# Patient Record
Sex: MALE | Race: WHITE | NOT HISPANIC OR LATINO | Employment: UNEMPLOYED | URBAN - METROPOLITAN AREA
[De-identification: names, ages, dates, MRNs, and addresses within clinical notes are randomized per-mention and may not be internally consistent; named-entity substitution may affect disease eponyms.]

---

## 2023-09-25 ENCOUNTER — TELEPHONE (OUTPATIENT)
Dept: BEHAVIORAL HEALTH | Facility: CLINIC | Age: 53
End: 2023-09-25

## 2023-09-25 NOTE — TELEPHONE ENCOUNTER
Reason for call:  Other   Patient called regarding (reason for call): pt call regarding suicidal intentions connected   Additional comments: suicidal intent and homicidal towards others.   Pt said they have been drinking heavily and may need detox . Patient refused emergency call and insisted they do not want anyone showing up at their house. Op intake contacted consulted conference inpatient line and patient made a plan to come in on their own in the next day or so for inpatient help.   Phone number to reach patient:  Cell number on file:    Telephone Information:   Mobile [phone]       Best Time:  any     Can we leave a detailed message on this number?  YES    Travel screening: Not Applicable

## 2023-09-27 ENCOUNTER — HOSPITAL ENCOUNTER (EMERGENCY)
Facility: CLINIC | Age: 53
End: 2023-09-27

## 2023-09-27 ENCOUNTER — TELEPHONE (OUTPATIENT)
Dept: BEHAVIORAL HEALTH | Facility: CLINIC | Age: 53
End: 2023-09-27

## 2023-09-27 ENCOUNTER — HOSPITAL ENCOUNTER (EMERGENCY)
Facility: CLINIC | Age: 53
Discharge: ANOTHER HOSPITAL | End: 2023-09-28
Attending: EMERGENCY MEDICINE | Admitting: EMERGENCY MEDICINE
Payer: MEDICAID

## 2023-09-27 DIAGNOSIS — F10.10 ETOH ABUSE: ICD-10-CM

## 2023-09-27 LAB
ALBUMIN SERPL BCG-MCNC: 4.3 G/DL (ref 3.5–5.2)
ALBUMIN UR-MCNC: 50 MG/DL
ALP SERPL-CCNC: 66 U/L (ref 40–129)
ALT SERPL W P-5'-P-CCNC: 46 U/L (ref 0–70)
AMPHETAMINES UR QL SCN: ABNORMAL
ANION GAP SERPL CALCULATED.3IONS-SCNC: 20 MMOL/L (ref 7–15)
APAP SERPL-MCNC: <5 UG/ML (ref 10–30)
APPEARANCE UR: CLEAR
AST SERPL W P-5'-P-CCNC: 70 U/L (ref 0–45)
BARBITURATES UR QL SCN: ABNORMAL
BASOPHILS # BLD AUTO: 0.1 10E3/UL (ref 0–0.2)
BASOPHILS NFR BLD AUTO: 1 %
BENZODIAZ UR QL SCN: ABNORMAL
BILIRUB SERPL-MCNC: 0.8 MG/DL
BILIRUB UR QL STRIP: NEGATIVE
BUN SERPL-MCNC: 9.7 MG/DL (ref 6–20)
BZE UR QL SCN: ABNORMAL
CALCIUM SERPL-MCNC: 8.6 MG/DL (ref 8.6–10)
CANNABINOIDS UR QL SCN: ABNORMAL
CHLORIDE SERPL-SCNC: 92 MMOL/L (ref 98–107)
COLOR UR AUTO: YELLOW
CREAT SERPL-MCNC: 1.07 MG/DL (ref 0.67–1.17)
DEPRECATED HCO3 PLAS-SCNC: 27 MMOL/L (ref 22–29)
EGFRCR SERPLBLD CKD-EPI 2021: 83 ML/MIN/1.73M2
EOSINOPHIL # BLD AUTO: 0 10E3/UL (ref 0–0.7)
EOSINOPHIL NFR BLD AUTO: 0 %
ERYTHROCYTE [DISTWIDTH] IN BLOOD BY AUTOMATED COUNT: 13.9 % (ref 10–15)
ETHANOL SERPL-MCNC: 0.09 G/DL
ETHANOL SERPL-MCNC: 0.19 G/DL
ETHANOL SERPL-MCNC: 0.31 G/DL
FENTANYL UR QL: ABNORMAL
GLUCOSE SERPL-MCNC: 117 MG/DL (ref 70–99)
GLUCOSE UR STRIP-MCNC: NEGATIVE MG/DL
HCT VFR BLD AUTO: 49.2 % (ref 40–53)
HGB BLD-MCNC: 17.3 G/DL (ref 13.3–17.7)
HGB UR QL STRIP: ABNORMAL
HYALINE CASTS: 6 /LPF
IMM GRANULOCYTES # BLD: 0 10E3/UL
IMM GRANULOCYTES NFR BLD: 0 %
KETONES UR STRIP-MCNC: NEGATIVE MG/DL
LEUKOCYTE ESTERASE UR QL STRIP: NEGATIVE
LYMPHOCYTES # BLD AUTO: 2.3 10E3/UL (ref 0.8–5.3)
LYMPHOCYTES NFR BLD AUTO: 27 %
MAGNESIUM SERPL-MCNC: 1.7 MG/DL (ref 1.7–2.3)
MCH RBC QN AUTO: 31.7 PG (ref 26.5–33)
MCHC RBC AUTO-ENTMCNC: 35.2 G/DL (ref 31.5–36.5)
MCV RBC AUTO: 90 FL (ref 78–100)
MONOCYTES # BLD AUTO: 0.8 10E3/UL (ref 0–1.3)
MONOCYTES NFR BLD AUTO: 9 %
NEUTROPHILS # BLD AUTO: 5.5 10E3/UL (ref 1.6–8.3)
NEUTROPHILS NFR BLD AUTO: 63 %
NITRATE UR QL: NEGATIVE
NRBC # BLD AUTO: 0 10E3/UL
NRBC BLD AUTO-RTO: 0 /100
OPIATES UR QL SCN: ABNORMAL
PCP QUAL URINE (ROCHE): ABNORMAL
PH UR STRIP: 5.5 [PH] (ref 5–7)
PLATELET # BLD AUTO: 356 10E3/UL (ref 150–450)
POTASSIUM SERPL-SCNC: 2.7 MMOL/L (ref 3.4–5.3)
POTASSIUM SERPL-SCNC: 2.7 MMOL/L (ref 3.4–5.3)
POTASSIUM SERPL-SCNC: 3.2 MMOL/L (ref 3.4–5.3)
PROT SERPL-MCNC: 6.9 G/DL (ref 6.4–8.3)
RBC # BLD AUTO: 5.46 10E6/UL (ref 4.4–5.9)
RBC URINE: 2 /HPF
SALICYLATES SERPL-MCNC: <0.3 MG/DL
SODIUM SERPL-SCNC: 139 MMOL/L (ref 135–145)
SP GR UR STRIP: 1.01 (ref 1–1.03)
UROBILINOGEN UR STRIP-MCNC: NORMAL MG/DL
WBC # BLD AUTO: 8.7 10E3/UL (ref 4–11)
WBC URINE: 2 /HPF

## 2023-09-27 PROCEDURE — 80143 DRUG ASSAY ACETAMINOPHEN: CPT | Performed by: EMERGENCY MEDICINE

## 2023-09-27 PROCEDURE — 80053 COMPREHEN METABOLIC PANEL: CPT | Performed by: EMERGENCY MEDICINE

## 2023-09-27 PROCEDURE — 80179 DRUG ASSAY SALICYLATE: CPT | Performed by: EMERGENCY MEDICINE

## 2023-09-27 PROCEDURE — 85025 COMPLETE CBC W/AUTO DIFF WBC: CPT | Performed by: EMERGENCY MEDICINE

## 2023-09-27 PROCEDURE — 250N000013 HC RX MED GY IP 250 OP 250 PS 637: Performed by: EMERGENCY MEDICINE

## 2023-09-27 PROCEDURE — 80307 DRUG TEST PRSMV CHEM ANLYZR: CPT | Performed by: EMERGENCY MEDICINE

## 2023-09-27 PROCEDURE — 36415 COLL VENOUS BLD VENIPUNCTURE: CPT | Performed by: EMERGENCY MEDICINE

## 2023-09-27 PROCEDURE — 81001 URINALYSIS AUTO W/SCOPE: CPT | Performed by: EMERGENCY MEDICINE

## 2023-09-27 PROCEDURE — 84132 ASSAY OF SERUM POTASSIUM: CPT | Performed by: EMERGENCY MEDICINE

## 2023-09-27 PROCEDURE — 83735 ASSAY OF MAGNESIUM: CPT | Performed by: EMERGENCY MEDICINE

## 2023-09-27 PROCEDURE — 99285 EMERGENCY DEPT VISIT HI MDM: CPT

## 2023-09-27 PROCEDURE — 82077 ASSAY SPEC XCP UR&BREATH IA: CPT | Performed by: EMERGENCY MEDICINE

## 2023-09-27 RX ORDER — MAGNESIUM HYDROXIDE/ALUMINUM HYDROXICE/SIMETHICONE 120; 1200; 1200 MG/30ML; MG/30ML; MG/30ML
30 SUSPENSION ORAL EVERY 4 HOURS PRN
Status: CANCELLED | OUTPATIENT
Start: 2023-09-27

## 2023-09-27 RX ORDER — ACETAMINOPHEN 325 MG/1
650 TABLET ORAL EVERY 4 HOURS PRN
Status: CANCELLED | OUTPATIENT
Start: 2023-09-27

## 2023-09-27 RX ORDER — POTASSIUM CHLORIDE 1.5 G/1.58G
40 POWDER, FOR SOLUTION ORAL ONCE
Status: DISCONTINUED | OUTPATIENT
Start: 2023-09-27 | End: 2023-09-27

## 2023-09-27 RX ORDER — ONDANSETRON 4 MG/1
4 TABLET, FILM COATED ORAL EVERY 6 HOURS PRN
Status: CANCELLED | OUTPATIENT
Start: 2023-09-27

## 2023-09-27 RX ORDER — POTASSIUM CHLORIDE 1.5 G/1.58G
40 POWDER, FOR SOLUTION ORAL ONCE
Status: COMPLETED | OUTPATIENT
Start: 2023-09-27 | End: 2023-09-27

## 2023-09-27 RX ORDER — AMOXICILLIN 250 MG
1 CAPSULE ORAL 2 TIMES DAILY PRN
Status: CANCELLED | OUTPATIENT
Start: 2023-09-27

## 2023-09-27 RX ORDER — TRAZODONE HYDROCHLORIDE 50 MG/1
50 TABLET, FILM COATED ORAL
Status: CANCELLED | OUTPATIENT
Start: 2023-09-27

## 2023-09-27 RX ORDER — LOPERAMIDE HCL 2 MG
2 CAPSULE ORAL 4 TIMES DAILY PRN
Status: CANCELLED | OUTPATIENT
Start: 2023-09-27

## 2023-09-27 RX ORDER — POTASSIUM CHLORIDE 1500 MG/1
40 TABLET, EXTENDED RELEASE ORAL ONCE
Status: COMPLETED | OUTPATIENT
Start: 2023-09-27 | End: 2023-09-27

## 2023-09-27 RX ORDER — HYDROXYZINE HYDROCHLORIDE 25 MG/1
25 TABLET, FILM COATED ORAL EVERY 4 HOURS PRN
Status: CANCELLED | OUTPATIENT
Start: 2023-09-27

## 2023-09-27 RX ORDER — POTASSIUM CHLORIDE 29.8 MG/ML
20 INJECTION INTRAVENOUS ONCE
Status: DISCONTINUED | OUTPATIENT
Start: 2023-09-27 | End: 2023-09-27

## 2023-09-27 RX ADMIN — POTASSIUM CHLORIDE 40 MEQ: 1500 TABLET, EXTENDED RELEASE ORAL at 17:11

## 2023-09-27 RX ADMIN — POTASSIUM CHLORIDE 40 MEQ: 1.5 POWDER, FOR SOLUTION ORAL at 18:53

## 2023-09-27 ASSESSMENT — ACTIVITIES OF DAILY LIVING (ADL)
ADLS_ACUITY_SCORE: 35

## 2023-09-27 NOTE — ED PROVIDER NOTES
History     Chief Complaint:  Alcohol Problem       HPI   John Paul Velazquez is a 53 year old male alcohol problem.  Patient has history of alcohol abuse.  Reports drinking a pint of liquor a day.  Reports drinking half a pint today and last drink was an hour ago.  States that he is seeking help for alcohol detox.  States he has history of anxiety and depression.  States that he has had intermittent SI however no suicidal ideations at this time.      Independent Historian:   None - Patient Only    Review of External Notes:   No prior medical records.      Medications:    No current outpatient medications on file.      Past Medical History:    No past medical history on file.    Past Surgical History:    No past surgical history on file.     Physical Exam   Patient Vitals for the past 24 hrs:   BP Temp Temp src Pulse Resp SpO2   09/27/23 1442 (!) 133/93 98  F (36.7  C) Temporal 67 18 98 %        Physical Exam  Vitals reviewed.   Constitutional:       General: He is not in acute distress.     Appearance: He is not ill-appearing.   HENT:      Head: Normocephalic and atraumatic.   Eyes:      Extraocular Movements: Extraocular movements intact.   Cardiovascular:      Rate and Rhythm: Normal rate and regular rhythm.   Pulmonary:      Effort: Pulmonary effort is normal. No respiratory distress.      Breath sounds: Normal breath sounds. No wheezing.   Abdominal:      Palpations: Abdomen is soft.      Tenderness: There is no abdominal tenderness. There is no guarding.   Musculoskeletal:      Cervical back: Normal range of motion.   Skin:     General: Skin is warm and dry.   Neurological:      Mental Status: He is alert and oriented to person, place, and time.      GCS: GCS eye subscore is 4. GCS verbal subscore is 5. GCS motor subscore is 6.   Psychiatric:         Behavior: Behavior normal.           Emergency Department Course       Imaging:  No orders to display          Laboratory:  Labs Ordered and Resulted from Time of ED  Arrival to Time of ED Departure   COMPREHENSIVE METABOLIC PANEL - Abnormal       Result Value    Sodium 139      Potassium 2.7 (*)     Carbon Dioxide (CO2) 27      Anion Gap 20 (*)     Urea Nitrogen 9.7      Creatinine 1.07      GFR Estimate 83      Calcium 8.6      Chloride 92 (*)     Glucose 117 (*)     Alkaline Phosphatase 66      AST 70 (*)     ALT 46      Protein Total 6.9      Albumin 4.3      Bilirubin Total 0.8     ROUTINE UA WITH MICROSCOPIC REFLEX TO CULTURE - Abnormal    Color Urine Yellow      Appearance Urine Clear      Glucose Urine Negative      Bilirubin Urine Negative      Ketones Urine Negative      Specific Gravity Urine 1.012      Blood Urine Small (*)     pH Urine 5.5      Protein Albumin Urine 50 (*)     Urobilinogen Urine Normal      Nitrite Urine Negative      Leukocyte Esterase Urine Negative      RBC Urine 2      WBC Urine 2      Hyaline Casts Urine 6 (*)    ETHYL ALCOHOL LEVEL - Abnormal    Alcohol ethyl 0.31 (*)    ACETAMINOPHEN LEVEL - Abnormal    Acetaminophen <5.0 (*)    DRUG ABUSE SCREEN QUAL URINE - Abnormal    Amphetamines Urine Screen Negative      Barbituates Urine Screen Negative      Benzodiazepine Urine Screen Negative      Cannabinoids Urine Screen Positive (*)     Cocaine Urine Screen Positive (*)     Fentanyl Qual Urine Screen Negative      Opiates Urine Screen Negative      PCP Urine Screen Negative     POTASSIUM - Abnormal    Potassium 2.7 (*)    ETHYL ALCOHOL LEVEL - Abnormal    Alcohol ethyl 0.19 (*)    MAGNESIUM - Normal    Magnesium 1.7     SALICYLATE LEVEL - Normal    Salicylate <0.3     CBC WITH PLATELETS AND DIFFERENTIAL    WBC Count 8.7      RBC Count 5.46      Hemoglobin 17.3      Hematocrit 49.2      MCV 90      MCH 31.7      MCHC 35.2      RDW 13.9      Platelet Count 356      % Neutrophils 63      % Lymphocytes 27      % Monocytes 9      % Eosinophils 0      % Basophils 1      % Immature Granulocytes 0      NRBCs per 100 WBC 0      Absolute Neutrophils 5.5       Absolute Lymphocytes 2.3      Absolute Monocytes 0.8      Absolute Eosinophils 0.0      Absolute Basophils 0.1      Absolute Immature Granulocytes 0.0      Absolute NRBCs 0.0          Procedures       Emergency Department Course & Assessments:    PSS-3      Date and Time Over the past 2 weeks have you felt down, depressed, or hopeless? Over the past 2 weeks have you had thoughts of killing yourself? Have you ever attempted to kill yourself? When did this last happen? User   23 1441 yes yes -- -- LND                Item Assessment   Suicidal Ideation None   Plan None   Intent Non   Suicidal or self-harm behaviors Hx ETOH abuse   Risk Factors Substance abuse or dependence   Protective Factors Supportive social network of family and/or friends       Interventions:  Medications   potassium chloride ER (KLOR-CON M) CR tablet 40 mEq (40 mEq Oral $Given 23 1711)   potassium chloride (KLOR-CON) Packet 40 mEq (40 mEq Oral $Given 23 1853)        Assessments:  Initially seen and evaluated in triage.    Independent Interpretation (X-rays, CTs, rhythm strip):  None    Consultations/Discussion of Management or Tests:  None   ED Course as of 09/27/23 2052   Wed Sep 27, 2023   1643 Reassessed patient he is resting comfortably.  Potassium replaced.   1646 Patient has a place at [facility] for detox    Pt accepted to [facility] for detox by Dr. Doss       Social Determinants of Health affecting care:   None    Disposition:  The patient was transferred to [facility] via EMS. Dr. Doss accepted the patient for transfer.     Impression & Plan        Medical Decision Makin-year-old male presenting today with alcohol abuse.  Patient states he is seeking detox.  States he drinks a pint today, last drink was an hour prior to arrival.  Currently no signs of alcohol withdrawal.  Denies any headache, nausea, vomiting, hallucinations.  No prior alcohol withdrawal seizures in the past.  On arrival labs were obtained  while in triage.  EtOH of 0.3.  He was brought back to behavioral.  A spot was secured at [facility] for the patient.  Patient required to be transported by EMS.  Patient noted to be hypokalemic, p.o. replacement given twice.  Patient was accepted for transfer.      Diagnosis:    ICD-10-CM    1. ETOH abuse  F10.10                Kristel Rodriguez DO  9/27/2023   Kristel Rodriguez DO Doan, Tiffani, DO  09/27/23 2056

## 2023-09-27 NOTE — ED NOTES
PIT/Triage Evaluation    Patient presented with alcohol problem.  Patient states that he has been drinking alcohol daily for the last 5 years.  States he drinks a pint a day.  Last drink was 1 hour ago.  States he has had half a pint today.  He states that he is seeking treatment for alcohol detox.  States his cousin brought him to the ER today.  He also states that he has history of anxiety and depression.  States that he has had SI on and off.  Currently denies any suicidal ideations.  Denies any current headaches, lightheadedness, dizziness.    Exam is notable for:  General:  Alert, interactive  Cardiovascular:  Well perfused  Lungs:  No respiratory distress, no accessory muscle use  Neuro:  Moving all 4 extremities  Skin:  Warm, dry  Psych:  Normal affect      Appropriate interventions for symptom management were initiated if applicable.  Appropriate diagnostic tests were initiated if indicated.    Important information for subsequent clinician:  Plan to patient that there is not a detox facility here.  Patient states that he was told to come here for detox.  Discussed plan for blood work and then possible with transfer to detox center.    I briefly evaluated the patient and developed an initial plan of care. I discussed this plan and explained that this brief interaction does not constitute a full evaluation. Patient/family understands that they should wait to be fully evaluated and discuss any test results with another clinician prior to leaving the hospital.       Kristel Rodriguez DO  09/27/23 0301       Kristel Rodriguez DO  09/27/23 [id number]

## 2023-09-27 NOTE — ED TRIAGE NOTES
Presents to ED for alcohol detoxication; pt appears drunk. Says he had 1/2 pint about an hour ago. Daily drinker, hx withdrawals. Seeking detox. Pt says he has thoughts of SI/HI, but at this moment he does not.

## 2023-09-27 NOTE — ED NOTES
Patient requested DETOX, writer called [facility] and place a bed hold.  K= replacement and redraw with ETOH recheck and then he will be discharge and his cousin will drive him to [place].

## 2023-09-27 NOTE — ED NOTES
Patrick HOFF and ETOH for [facility].  IF they are acceptable he will get a ride to [place] [facility] detox with his cousin.

## 2023-09-27 NOTE — TELEPHONE ENCOUNTER
S: [facility] ED , Provider LUIS Buenrostro RN calling at 4:25 PM with clinical on a 53 year old/Male presenting for alcohol detox.     B: Pt presents for ETOH detox.   Currently reports drinking 1 pint daily for last 5 days but drinking for years.    Patient reports last use was just prior to ED.  Pt BAC: .31  Pt  denies hx of DT  Pt  denies hx of seizures. Last seizure: N/A  Pt endorsing the following symptoms of withdrawal: Hypertensive and Still too intoxicated  MSSA Score: [facility] does CIWA = 7 but still really intoxicated    Pt got a banana bag and potassium.    Pt denies acute mental health or medical concerns.   Pt endorses other drug use: Cocaine Amount/frequency: N/A    Does Pt have a detox care plan in [facility]? No  Does pt present with specific needs, assistive devices, or exclusionary criteria? None  Is the patient ambulating, eating and drinking in the ED? Yes    A: Pt meets criteria to be presented for IP detox admission. Patient is voluntary    COVID Symptoms: No  If yes, COVID test required   Utox: Positive for THC and Cocaine  CMP: Abnormalities: Potassium is 2.7; Anion Gap is 2.0; and AST is 70  CBC: WNL  HCG: N/A     R: Patient cleared and ready for behavioral bed placement: Yes    Pt is meeting criteria for presentation to 3A/CD    Does Patient need a Transfer Center request created? Yes, writer completed Transfer Center request at:  4:42 PM    4:37 PM Paged 3A Provider    4:39 PM Anna wants Potassium levels and ETOH rechecked before formally accepting.    4:43 PM Called [facility] ED and talked to JOSSY Buenrostro who was about to give Pt more Potassium.  Updated that afterwards to have Potassium levels and ETOH rechecked and Intake will re-present with updated numbers.    6:48 PM Chitra [facility] ED RN updated on Pt.    6:52 PM Paged 3A On Call Provider    7:12 PM Diana accepted for 3A/CD/Rom[facility]    Updated worklist and added to Admit Board; did transfer center and bed planning and did Indicia.    7:23 PM Updated  3A and left a message for Charge RN    7:24 PM Updated [facility] ED

## 2023-09-28 ENCOUNTER — HOSPITAL ENCOUNTER (INPATIENT)
Facility: CLINIC | Age: 53
LOS: 3 days | Discharge: HOME | End: 2023-10-01
Attending: PSYCHIATRY & NEUROLOGY | Admitting: PSYCHIATRY & NEUROLOGY
Payer: MEDICAID

## 2023-09-28 VITALS
SYSTOLIC BLOOD PRESSURE: 131 MMHG | TEMPERATURE: 98 F | DIASTOLIC BLOOD PRESSURE: 98 MMHG | HEART RATE: 92 BPM | WEIGHT: 140 LBS | RESPIRATION RATE: 18 BRPM | OXYGEN SATURATION: 97 %

## 2023-09-28 DIAGNOSIS — Z92.89 S/P ALCOHOL DETOXIFICATION: Primary | ICD-10-CM

## 2023-09-28 LAB
ANION GAP SERPL CALCULATED.3IONS-SCNC: 10 MMOL/L (ref 7–15)
BUN SERPL-MCNC: 10.8 MG/DL (ref 6–20)
CALCIUM SERPL-MCNC: 9 MG/DL (ref 8.6–10)
CHLORIDE SERPL-SCNC: 98 MMOL/L (ref 98–107)
CREAT SERPL-MCNC: 1.04 MG/DL (ref 0.67–1.17)
EGFRCR SERPLBLD CKD-EPI 2021: 86 ML/MIN/1.73M2
GLUCOSE SERPL-MCNC: 167 MG/DL (ref 70–99)
HCO3 SERPL-SCNC: 31 MMOL/L (ref 22–29)
MAGNESIUM SERPL-MCNC: 1.5 MG/DL (ref 1.7–2.3)
PHOSPHATE SERPL-MCNC: 2.5 MG/DL (ref 2.5–4.5)
POTASSIUM SERPL-SCNC: 2.9 MMOL/L (ref 3.4–5.3)
POTASSIUM SERPL-SCNC: 3.8 MMOL/L (ref 3.4–5.3)
SODIUM SERPL-SCNC: 139 MMOL/L (ref 135–145)

## 2023-09-28 PROCEDURE — 250N000013 HC RX MED GY IP 250 OP 250 PS 637: Performed by: REGISTERED NURSE

## 2023-09-28 PROCEDURE — 250N000013 HC RX MED GY IP 250 OP 250 PS 637: Performed by: EMERGENCY MEDICINE

## 2023-09-28 PROCEDURE — 84100 ASSAY OF PHOSPHORUS: CPT | Performed by: PHYSICIAN ASSISTANT

## 2023-09-28 PROCEDURE — 82374 ASSAY BLOOD CARBON DIOXIDE: CPT | Performed by: PHYSICIAN ASSISTANT

## 2023-09-28 PROCEDURE — 99222 1ST HOSP IP/OBS MODERATE 55: CPT | Performed by: PHYSICIAN ASSISTANT

## 2023-09-28 PROCEDURE — 250N000013 HC RX MED GY IP 250 OP 250 PS 637: Performed by: PHYSICIAN ASSISTANT

## 2023-09-28 PROCEDURE — 36415 COLL VENOUS BLD VENIPUNCTURE: CPT | Performed by: PHYSICIAN ASSISTANT

## 2023-09-28 PROCEDURE — 250N000013 HC RX MED GY IP 250 OP 250 PS 637: Performed by: PSYCHIATRY & NEUROLOGY

## 2023-09-28 PROCEDURE — 93010 ELECTROCARDIOGRAM REPORT: CPT | Performed by: INTERNAL MEDICINE

## 2023-09-28 PROCEDURE — 83735 ASSAY OF MAGNESIUM: CPT | Performed by: PHYSICIAN ASSISTANT

## 2023-09-28 PROCEDURE — 84132 ASSAY OF SERUM POTASSIUM: CPT | Performed by: PHYSICIAN ASSISTANT

## 2023-09-28 PROCEDURE — HZ2ZZZZ DETOXIFICATION SERVICES FOR SUBSTANCE ABUSE TREATMENT: ICD-10-PCS | Performed by: PSYCHIATRY & NEUROLOGY

## 2023-09-28 PROCEDURE — 99223 1ST HOSP IP/OBS HIGH 75: CPT | Mod: AI | Performed by: PSYCHIATRY & NEUROLOGY

## 2023-09-28 PROCEDURE — 999N000054 HC STATISTIC EKG NON-CHARGEABLE

## 2023-09-28 PROCEDURE — 128N000004 HC R&B CD ADULT

## 2023-09-28 RX ORDER — METOPROLOL SUCCINATE 25 MG/1
25 TABLET, EXTENDED RELEASE ORAL DAILY
Status: ON HOLD | COMMUNITY
End: 2023-10-01

## 2023-09-28 RX ORDER — TRAZODONE HYDROCHLORIDE 50 MG/1
50 TABLET, FILM COATED ORAL
Status: DISCONTINUED | OUTPATIENT
Start: 2023-09-28 | End: 2023-10-01 | Stop reason: HOSPADM

## 2023-09-28 RX ORDER — NICOTINE 21 MG/24HR
1 PATCH, TRANSDERMAL 24 HOURS TRANSDERMAL DAILY
Status: DISCONTINUED | OUTPATIENT
Start: 2023-09-28 | End: 2023-10-01 | Stop reason: HOSPADM

## 2023-09-28 RX ORDER — LOPERAMIDE HCL 2 MG
2 CAPSULE ORAL 4 TIMES DAILY PRN
Status: DISCONTINUED | OUTPATIENT
Start: 2023-09-28 | End: 2023-10-01 | Stop reason: HOSPADM

## 2023-09-28 RX ORDER — FUROSEMIDE 40 MG
40 TABLET ORAL 2 TIMES DAILY
Status: DISCONTINUED | OUTPATIENT
Start: 2023-09-28 | End: 2023-10-01 | Stop reason: HOSPADM

## 2023-09-28 RX ORDER — POTASSIUM CHLORIDE 750 MG/1
10 TABLET, EXTENDED RELEASE ORAL DAILY
COMMUNITY

## 2023-09-28 RX ORDER — LISINOPRIL 10 MG/1
10 TABLET ORAL DAILY
Status: DISCONTINUED | OUTPATIENT
Start: 2023-09-28 | End: 2023-10-01 | Stop reason: HOSPADM

## 2023-09-28 RX ORDER — DIAZEPAM 5 MG
5-20 TABLET ORAL EVERY 30 MIN PRN
Status: DISCONTINUED | OUTPATIENT
Start: 2023-09-28 | End: 2023-10-01 | Stop reason: HOSPADM

## 2023-09-28 RX ORDER — ALBUTEROL SULFATE 90 UG/1
2 AEROSOL, METERED RESPIRATORY (INHALATION) EVERY 6 HOURS PRN
COMMUNITY

## 2023-09-28 RX ORDER — METOPROLOL SUCCINATE 25 MG/1
25 TABLET, EXTENDED RELEASE ORAL DAILY
Status: DISCONTINUED | OUTPATIENT
Start: 2023-09-28 | End: 2023-10-01 | Stop reason: HOSPADM

## 2023-09-28 RX ORDER — FOLIC ACID 1 MG/1
1 TABLET ORAL DAILY
Status: DISCONTINUED | OUTPATIENT
Start: 2023-09-28 | End: 2023-10-01 | Stop reason: HOSPADM

## 2023-09-28 RX ORDER — ONDANSETRON 4 MG/1
4 TABLET, FILM COATED ORAL EVERY 6 HOURS PRN
Status: DISCONTINUED | OUTPATIENT
Start: 2023-09-28 | End: 2023-10-01 | Stop reason: HOSPADM

## 2023-09-28 RX ORDER — LISINOPRIL 10 MG/1
10 TABLET ORAL DAILY
COMMUNITY

## 2023-09-28 RX ORDER — MAGNESIUM OXIDE 400 MG/1
400 TABLET ORAL 2 TIMES DAILY
Status: DISCONTINUED | OUTPATIENT
Start: 2023-09-28 | End: 2023-09-29

## 2023-09-28 RX ORDER — MAGNESIUM HYDROXIDE/ALUMINUM HYDROXICE/SIMETHICONE 120; 1200; 1200 MG/30ML; MG/30ML; MG/30ML
30 SUSPENSION ORAL EVERY 4 HOURS PRN
Status: DISCONTINUED | OUTPATIENT
Start: 2023-09-28 | End: 2023-10-01 | Stop reason: HOSPADM

## 2023-09-28 RX ORDER — AMOXICILLIN 250 MG
1 CAPSULE ORAL 2 TIMES DAILY PRN
Status: DISCONTINUED | OUTPATIENT
Start: 2023-09-28 | End: 2023-10-01 | Stop reason: HOSPADM

## 2023-09-28 RX ORDER — POTASSIUM CHLORIDE 750 MG/1
10 CAPSULE, EXTENDED RELEASE ORAL DAILY
Status: DISCONTINUED | OUTPATIENT
Start: 2023-09-28 | End: 2023-10-01 | Stop reason: HOSPADM

## 2023-09-28 RX ORDER — POTASSIUM CHLORIDE 20MEQ/15ML
40 LIQUID (ML) ORAL ONCE
Status: COMPLETED | OUTPATIENT
Start: 2023-09-28 | End: 2023-09-28

## 2023-09-28 RX ORDER — ACETAMINOPHEN 325 MG/1
650 TABLET ORAL EVERY 4 HOURS PRN
Status: DISCONTINUED | OUTPATIENT
Start: 2023-09-28 | End: 2023-10-01 | Stop reason: HOSPADM

## 2023-09-28 RX ORDER — ALBUTEROL SULFATE 90 UG/1
2 AEROSOL, METERED RESPIRATORY (INHALATION) EVERY 6 HOURS PRN
Status: DISCONTINUED | OUTPATIENT
Start: 2023-09-28 | End: 2023-10-01 | Stop reason: HOSPADM

## 2023-09-28 RX ORDER — POTASSIUM CHLORIDE 1.5 G/1.58G
40 POWDER, FOR SOLUTION ORAL ONCE
Status: COMPLETED | OUTPATIENT
Start: 2023-09-28 | End: 2023-09-28

## 2023-09-28 RX ORDER — BUDESONIDE AND FORMOTEROL FUMARATE DIHYDRATE 160; 4.5 UG/1; UG/1
2 AEROSOL RESPIRATORY (INHALATION)
COMMUNITY

## 2023-09-28 RX ORDER — FLUTICASONE FUROATE AND VILANTEROL 200; 25 UG/1; UG/1
1 POWDER RESPIRATORY (INHALATION) DAILY
Status: DISCONTINUED | OUTPATIENT
Start: 2023-09-28 | End: 2023-10-01 | Stop reason: HOSPADM

## 2023-09-28 RX ORDER — FUROSEMIDE 40 MG
40 TABLET ORAL 2 TIMES DAILY
COMMUNITY

## 2023-09-28 RX ORDER — CLONIDINE HYDROCHLORIDE 0.1 MG/1
0.1 TABLET ORAL EVERY 6 HOURS PRN
Status: DISCONTINUED | OUTPATIENT
Start: 2023-09-28 | End: 2023-10-01 | Stop reason: HOSPADM

## 2023-09-28 RX ORDER — MULTIPLE VITAMINS W/ MINERALS TAB 9MG-400MCG
1 TAB ORAL DAILY
Status: DISCONTINUED | OUTPATIENT
Start: 2023-09-28 | End: 2023-10-01 | Stop reason: HOSPADM

## 2023-09-28 RX ORDER — LISINOPRIL 5 MG/1
5 TABLET ORAL DAILY
Status: DISCONTINUED | OUTPATIENT
Start: 2023-09-28 | End: 2023-09-28

## 2023-09-28 RX ORDER — FLUTICASONE FUROATE AND VILANTEROL 200; 25 UG/1; UG/1
2 POWDER RESPIRATORY (INHALATION) 2 TIMES DAILY
Status: DISCONTINUED | OUTPATIENT
Start: 2023-09-28 | End: 2023-09-28

## 2023-09-28 RX ORDER — HYDROXYZINE HYDROCHLORIDE 25 MG/1
25 TABLET, FILM COATED ORAL EVERY 4 HOURS PRN
Status: DISCONTINUED | OUTPATIENT
Start: 2023-09-28 | End: 2023-10-01 | Stop reason: HOSPADM

## 2023-09-28 RX ADMIN — ALBUTEROL SULFATE 2 PUFF: 90 AEROSOL, METERED RESPIRATORY (INHALATION) at 21:00

## 2023-09-28 RX ADMIN — DIAZEPAM 10 MG: 5 TABLET ORAL at 07:10

## 2023-09-28 RX ADMIN — FLUTICASONE FUROATE AND VILANTEROL TRIFENATATE 1 PUFF: 200; 25 POWDER RESPIRATORY (INHALATION) at 13:15

## 2023-09-28 RX ADMIN — POTASSIUM CHLORIDE 40 MEQ: 1.5 POWDER, FOR SOLUTION ORAL at 00:33

## 2023-09-28 RX ADMIN — POTASSIUM CHLORIDE 40 MEQ: 40 SOLUTION ORAL at 12:46

## 2023-09-28 RX ADMIN — MAGNESIUM OXIDE TAB 400 MG (241.3 MG ELEMENTAL MG) 400 MG: 400 (241.3 MG) TAB at 20:50

## 2023-09-28 RX ADMIN — ALBUTEROL SULFATE 2 PUFF: 90 AEROSOL, METERED RESPIRATORY (INHALATION) at 11:17

## 2023-09-28 RX ADMIN — ACETAMINOPHEN 650 MG: 325 TABLET, FILM COATED ORAL at 16:50

## 2023-09-28 RX ADMIN — DIAZEPAM 10 MG: 5 TABLET ORAL at 22:21

## 2023-09-28 RX ADMIN — HYDROXYZINE HYDROCHLORIDE 25 MG: 25 TABLET, FILM COATED ORAL at 20:50

## 2023-09-28 RX ADMIN — DIAZEPAM 10 MG: 5 TABLET ORAL at 02:51

## 2023-09-28 RX ADMIN — LISINOPRIL 10 MG: 10 TABLET ORAL at 12:47

## 2023-09-28 RX ADMIN — NICOTINE POLACRILEX 2 MG: 2 GUM, CHEWING BUCCAL at 16:53

## 2023-09-28 RX ADMIN — MULTIPLE VITAMINS W/ MINERALS TAB 1 TABLET: TAB at 08:28

## 2023-09-28 RX ADMIN — THIAMINE HCL TAB 100 MG 100 MG: 100 TAB at 08:28

## 2023-09-28 RX ADMIN — MAGNESIUM OXIDE TAB 400 MG (241.3 MG ELEMENTAL MG) 400 MG: 400 (241.3 MG) TAB at 12:47

## 2023-09-28 RX ADMIN — TRAZODONE HYDROCHLORIDE 50 MG: 50 TABLET ORAL at 20:49

## 2023-09-28 RX ADMIN — POTASSIUM CHLORIDE 10 MEQ: 750 CAPSULE, EXTENDED RELEASE ORAL at 12:47

## 2023-09-28 RX ADMIN — FOLIC ACID 1 MG: 1 TABLET ORAL at 08:28

## 2023-09-28 RX ADMIN — NICOTINE 1 PATCH: 14 PATCH, EXTENDED RELEASE TRANSDERMAL at 09:15

## 2023-09-28 RX ADMIN — DIAZEPAM 10 MG: 5 TABLET ORAL at 16:50

## 2023-09-28 ASSESSMENT — ACTIVITIES OF DAILY LIVING (ADL)
ADLS_ACUITY_SCORE: 28
ADLS_ACUITY_SCORE: 28
DRESS: INDEPENDENT
ADLS_ACUITY_SCORE: 28
HYGIENE/GROOMING: INDEPENDENT
ADLS_ACUITY_SCORE: 28
ADLS_ACUITY_SCORE: 28
ORAL_HYGIENE: INDEPENDENT
ADLS_ACUITY_SCORE: 28
DRESS: INDEPENDENT
HYGIENE/GROOMING: INDEPENDENT
ADLS_ACUITY_SCORE: 35
ADLS_ACUITY_SCORE: 28
ORAL_HYGIENE: INDEPENDENT
ADLS_ACUITY_SCORE: 28

## 2023-09-28 ASSESSMENT — LIFESTYLE VARIABLES
AUDIT-C TOTAL SCORE: 12
SKIP TO QUESTIONS 9-10: 0

## 2023-09-28 NOTE — PLAN OF CARE
Goal Outcome Evaluation:    Plan of Care Reviewed With: patient      Problem: Substance Withdrawal  Goal: Substance Withdrawal  Description: Signs and symptoms of listed problems will be absent or manageable.  Outcome: Progressing     Pt presented with a flat affect, anxious mood. Endorsed anxiety 8/10, PRN Valium given with MSSA assessment, depression 8/10, generalized body pain 5/10, PRN Tylenol effective. Denied SI, HI, hallucinations, and contracted for safety. MSSA was 9 & 4, received PRN Valium x 1. PRN hydroxyzine & trazodone given at HS. Pt retired early to bed. A round 2215, PA reported to writer that this pt was very agitated and had slammed the door. Writer assessed pt who stated that he was experiencing withdrawal, MSSA was done, scored 9, PRN Valium given at 2221.

## 2023-09-28 NOTE — PLAN OF CARE
Problem: Adult Behavioral Health Plan of Care  Goal: Plan of Care Review  Outcome: Progressing  Flowsheets (Taken 9/28/2023 1013)  Patient Agreement with Plan of Care: agrees   Goal Outcome Evaluation:    Plan of Care Reviewed With: patient          Patient alert and oriented on approach. He had breakfast and is drinking adequately. Denied pain, SI, HI, depression and anxiety, hallucinations and contracted for safety. Patient is engaging on approach.   This shift, MSSA done once since last MSSA was right before 0800. MSSA at 1200 was 3.  Patient BP noted as increased by PA. Writer rechecked patient's BP at 0813 and it was 147/114. Internal medicine provider Kelsey paged about BP at 0823. Clonidine ordered by her. Writer rechecked BP to administer clonidine and BP decreased to 138/99-no prn / clonidine noted. Kelsey informed.  Patient completed his menus and spoke with provider on unit.     Kelsey placed new orders and pharmacist spoke with patient and his outside pharmacy and new medications verified. Writer rechecked patient BP and HR in order to administer scheduled Lisinopril and Metoprolol-no parameters noted. Writer spoke with provider Kelsey who was on unit and she worked with patient and writer and agreed for writer to administer the lisinopril only due to HR at 50 and the fluctuations noted in patient's HR-metoprolol not administered. Patient remains fine on unit and has been calm, pleasant, cooperative and compliant with medications. See Kelsey's note for details and she agreed to update BP medications by adding parameters.

## 2023-09-28 NOTE — PLAN OF CARE
Behavioral Team Discussion: (9/28/2023)    Continued Stay Criteria/Rationale: Patient admitted for  Alcohol Use Disorder.  Plan: The following services will be provided to the patient; psychiatric assessment, medication management, therapeutic milieu, individual and group support, and skills groups.   Participants: 3A Provider: Dr. Basil Doss MD; 3A RN: Aline Nicohle, RN; 3A CM's: Bruce Campbell.  Summary/Recommendation: Providers will assess today for treatment recommendations, discharge planning, and aftercare plans. CM will meet with pt for discharge planning.   Medical/Physical: Patient denies any medical or physical concerns at this time.  Precautions:   Behavioral Orders   Procedures    Code 1 - Restrict to Unit    Routine Programming     As clinically indicated    Status 15     Every 15 minutes.    Withdrawal precautions     Rationale for change in precautions or plan: N/A  Progress: Initial.    ASAM Dimension Scale Ratings:  Dimension 1: 3 Client tolerates and deya with withdrawal discomfort poorly. Client has severe intoxication, such that the client endangers self or others, or intoxication has not abated with less intensive levels of services. Client displays severe signs and symptoms; or risk of severe, but manageable withdrawal; or withdrawal worsening despite detox at less intensive level.  Dimension 2: 1 Client tolerates and deya with physical discomfort and is able to get the services that the client needs.  Dimension 3: 2 Client has difficulty with impulse control and lacks coping skills. Client has thoughts of suicide or harm to others without means; however, the thoughts may interfere with participation in some treatment activities. Client has difficulty functioning in significant life areas. Client has moderate symptoms of emotional, behavioral, or cognitive problems. Client is able to participate in most treatment activities.  Dimension 4: 3 Client displays inconsistent compliance, minimal  awareness of either the client's addiction or mental disorder, and is minimally cooperative.  Dimension 5: 3 Client has poor recognition and understanding of relapse and recidivism issues and displays moderately high vulnerability for further substance use or mental health problems. Client has few coping skills and rarely applies coping skills.  Dimension 6: 3 Client is not engaged in structured, meaningful activity and the client's peers, family, significant other, and living environment are unsupportive, or there is significant criminal justice system involvement.

## 2023-09-28 NOTE — ED NOTES
K redraw was the same additional K given, called [facility] unit intake, they will call with acceptance by MD and get report from us.  Cousin is here to transport him to [place].

## 2023-09-28 NOTE — PHARMACY-ADMISSION MEDICATION HISTORY
Pharmacist Admission Medication History    Admission medication history is complete. The information provided in this note is only as accurate as the sources available at the time of the update.    Medication reconciliation/reorder completed by provider prior to medication history? No    Information Source(s): Patient and Patient's pharmacy via phone    Pertinent Information: Patient does not remember his medications but reports that he takes 3. He knew he is taking lisinopril, potassium and lasix. I spoke to the Pharmacist at his pharmacy where he reports filling all of his medications. He has consistently filled the lasix, however has not filled any other prescriptions since April 2023. Medication list was updated with the information from the pharmacy.     Changes made to PTA medication list:  Added: Symbicort & metoprolol XL  Deleted: None  Changed: lisinopril dose increased from 5 mg -> 10mg    Medication Affordability:       Allergies reviewed with patient and updates made in EHR: unable to assess    Medication History Completed By: ROB DUBOIS [facility] 9/28/2023 11:16 AM    Prior to Admission medications    Medication Sig Last Dose Taking? Auth Provider Long Term End Date   budesonide-formoterol (SYMBICORT) 160-4.5 MCG/ACT Inhaler Inhale 2 puffs into the lungs two times daily Last filled April 2023 for 30 days supply   Unknown, Entered By History Yes    furosemide (LASIX) 40 MG tablet Take 40 mg by mouth 2 times daily Last filled August 2023 for 30 days supply Past Week Yes Reported, Patient Yes    lisinopril (ZESTRIL) 10 MG tablet Take 10 mg by mouth daily Last filled April 2023 9/27/2023 Yes Reported, Patient Yes    albuterol (PROAIR HFA/PROVENTIL HFA/VENTOLIN HFA) 108 (90 Base) MCG/ACT inhaler Inhale 2 puffs into the lungs every 6 hours as needed for shortness of breath, wheezing or cough   Reported, Patient Yes    metoprolol succinate ER (TOPROL XL) 25 MG 24 hr tablet Take 25 mg by mouth daily Last filled  April 2023 for 30 days supply   Unknown, Entered By History Yes    potassium chloride ER (K-TAB/KLOR-CON) 10 MEQ CR tablet Take 10 mEq by mouth daily Past Week Yes Reported, Patient       Desiree Brand, PharmD   Clinical Pharmacist

## 2023-09-28 NOTE — H&P
"[facility], [place]   Psychiatric History and Physical  Admission date: 9/28/2023        Chief Complaint:   \"I feel like crap\"        HPI:     The patient is a 54yo male with a history of alcohol use disorder and depression who was admitted to detoxify. Has also been abusing cocaine. Reports that he feels \"like crap.\" Has had some depression and passive SI but no urges or plans. Says \"I was trying to drink myself to death.\" No history of suicide attempts. Says that he \"barely\" slept. Denies any nausea but wasn't able to eat much. Open to MAT for AUD. Considering CD treatment.      Per ER:  John Paul Velazquez is a 53 year old male alcohol problem.  Patient has history of alcohol abuse.  Reports drinking a pint of liquor a day.  Reports drinking half a pint today and last drink was an hour ago.  States that he is seeking help for alcohol detox.  States he has history of anxiety and depression.  States that he has had intermittent SI however no suicidal ideations at this time.         Past Psychiatric History:     Denies any history of suicide attempts.         Substance Use and History:     Alcohol use disorder. Denies a history of withdrawal seizures or DTs.   Stimulant abuse.   Smokes 1/2 PPD.          Past Medical History:   PAST MEDICAL HISTORY: No past medical history on file.    PAST SURGICAL HISTORY: No past surgical history on file.          Family History:   FAMILY HISTORY: Father and mother with alcohol and cocaine abuse.         Social History:   Please see the full psychosocial profile from the clinical treatment coordinator.   SOCIAL HISTORY:   Social History     Tobacco Use    Smoking status: Not on file    Smokeless tobacco: Not on file   Substance Use Topics    Alcohol use: Not on file            Physical ROS:   The patient endorsed fatigue. The remainder of 10-point review of systems was negative except as noted in HPI.         PTA Medications:     No medications prior to " admission.          Allergies:   No Known Allergies       Labs:     Recent Results (from the past 48 hour(s))   Comprehensive metabolic panel    Collection Time: 09/27/23  2:49 PM   Result Value Ref Range    Sodium 139 135 - 145 mmol/L    Potassium 2.7 (L) 3.4 - 5.3 mmol/L    Carbon Dioxide (CO2) 27 22 - 29 mmol/L    Anion Gap 20 (H) 7 - 15 mmol/L    Urea Nitrogen 9.7 6.0 - 20.0 mg/dL    Creatinine 1.07 0.67 - 1.17 mg/dL    GFR Estimate 83 >60 mL/min/1.73m2    Calcium 8.6 8.6 - 10.0 mg/dL    Chloride 92 (L) 98 - 107 mmol/L    Glucose 117 (H) 70 - 99 mg/dL    Alkaline Phosphatase 66 40 - 129 U/L    AST 70 (H) 0 - 45 U/L    ALT 46 0 - 70 U/L    Protein Total 6.9 6.4 - 8.3 g/dL    Albumin 4.3 3.5 - 5.2 g/dL    Bilirubin Total 0.8 <=1.2 mg/dL   Magnesium    Collection Time: 09/27/23  2:49 PM   Result Value Ref Range    Magnesium 1.7 1.7 - 2.3 mg/dL   Alcohol level blood    Collection Time: 09/27/23  2:49 PM   Result Value Ref Range    Alcohol ethyl 0.31 (HH) <=0.01 g/dL   Salicylate level    Collection Time: 09/27/23  2:49 PM   Result Value Ref Range    Salicylate <0.3   mg/dL   Acetaminophen level    Collection Time: 09/27/23  2:49 PM   Result Value Ref Range    Acetaminophen <5.0 (L) 10.0 - 30.0 ug/mL   CBC with platelets and differential    Collection Time: 09/27/23  2:49 PM   Result Value Ref Range    WBC Count 8.7 4.0 - 11.0 10e3/uL    RBC Count 5.46 4.40 - 5.90 10e6/uL    Hemoglobin 17.3 13.3 - 17.7 g/dL    Hematocrit 49.2 40.0 - 53.0 %    MCV 90 78 - 100 fL    MCH 31.7 26.5 - 33.0 pg    MCHC 35.2 31.5 - 36.5 g/dL    RDW 13.9 10.0 - 15.0 %    Platelet Count 356 150 - 450 10e3/uL    % Neutrophils 63 %    % Lymphocytes 27 %    % Monocytes 9 %    % Eosinophils 0 %    % Basophils 1 %    % Immature Granulocytes 0 %    NRBCs per 100 WBC 0 <1 /100    Absolute Neutrophils 5.5 1.6 - 8.3 10e3/uL    Absolute Lymphocytes 2.3 0.8 - 5.3 10e3/uL    Absolute Monocytes 0.8 0.0 - 1.3 10e3/uL    Absolute Eosinophils 0.0 0.0 - 0.7  10e3/uL    Absolute Basophils 0.1 0.0 - 0.2 10e3/uL    Absolute Immature Granulocytes 0.0 <=0.4 10e3/uL    Absolute NRBCs 0.0 10e3/uL   UA with Microscopic reflex to Culture    Collection Time: 09/27/23  2:51 PM    Specimen: Urine, Midstream   Result Value Ref Range    Color Urine Yellow Colorless, Straw, Light Yellow, Yellow    Appearance Urine Clear Clear    Glucose Urine Negative Negative mg/dL    Bilirubin Urine Negative Negative    Ketones Urine Negative Negative mg/dL    Specific Gravity Urine 1.012 1.003 - 1.035    Blood Urine Small (A) Negative    pH Urine 5.5 5.0 - 7.0    Protein Albumin Urine 50 (A) Negative mg/dL    Urobilinogen Urine Normal Normal, 2.0 mg/dL    Nitrite Urine Negative Negative    Leukocyte Esterase Urine Negative Negative    RBC Urine 2 <=2 /HPF    WBC Urine 2 <=5 /HPF    Hyaline Casts Urine 6 (H) <=2 /LPF   Drug Abuse Screen Qual Urine    Collection Time: 09/27/23  2:51 PM   Result Value Ref Range    Amphetamines Urine Screen Negative Screen Negative    Barbituates Urine Screen Negative Screen Negative    Benzodiazepine Urine Screen Negative Screen Negative    Cannabinoids Urine Screen Positive (A) Screen Negative    Cocaine Urine Screen Positive (A) Screen Negative    Fentanyl Qual Urine Screen Negative Screen Negative    Opiates Urine Screen Negative Screen Negative    PCP Urine Screen Negative Screen Negative   Potassium    Collection Time: 09/27/23  6:11 PM   Result Value Ref Range    Potassium 2.7 (L) 3.4 - 5.3 mmol/L   Alcohol level blood    Collection Time: 09/27/23  6:11 PM   Result Value Ref Range    Alcohol ethyl 0.19 (H) <=0.01 g/dL   Potassium    Collection Time: 09/27/23 11:17 PM   Result Value Ref Range    Potassium 3.2 (L) 3.4 - 5.3 mmol/L   Alcohol level blood    Collection Time: 09/27/23 11:17 PM   Result Value Ref Range    Alcohol ethyl 0.09 (H) <=0.01 g/dL          Physical and Psychiatric Examination:     BP (!) 153/113   Pulse 97   Temp 98.8  F (37.1  C) (Oral)    "Resp 16   Ht 1.651 m (5' 5\")   Wt 54.4 kg (120 lb)   SpO2 96%   BMI 19.97 kg/m    Weight is 120 lbs 0 oz  Body mass index is 19.97 kg/m .    Physical Exam:  I have reviewed the physical exam as documented by the medical team and agree with findings and assessment and have no additional findings to add at this time.    Mental Status Exam:  Appearance: awake, alert and adequately groomed  Attitude:  cooperative  Eye Contact:  fair  Mood:  depressed  Affect:  mood congruent  Speech:  clear, coherent  Language: fluent and intact in English  Psychomotor, Gait, Musculoskeletal:  no evidence of tardive dyskinesia, dystonia, or tics  Thought Process:  goal oriented  Associations:  no loose associations  Thought Content:  no evidence of suicidal ideation or homicidal ideation and no evidence of psychotic thought  Insight:  fair  Judgement:  fair  Oriented to:  time, person, and place  Attention Span and Concentration:  intact  Recent and Remote Memory:  fair  Fund of Knowledge:  appropriate         Admission Diagnoses:     Alcohol use disorder, severe  Alcohol withdrawal with unspecified complication   MDD versus alcohol-induced depression  Nicotine dependence with current use     Clinically Significant Risk Factors Present on Admission     # Hypokalemia: Lowest K = 2.7 mmol/L in last 2 days, will replace as needed        # Anion Gap Metabolic Acidosis: Highest Anion Gap = 20 mmol/L in last 2 days, will monitor and treat as appropriate                                        Assessment & Plan:     1) MSSA with Valium.   2) Patient to be provided information on MAT for AUD.   3) Nicotine replacement available.   4) Patient considering CD treatment.     Disposition Plan   Reason for ongoing admission: requires detoxification from substance that poses a risk of bodily harm during withdrawal period  Discharge location: Chemical dependency treatment facility  Discharge Medications: not ordered  Follow-up Appointments: not " scheduled  Legal Status: voluntary    Entered by: Basil Doss MD on 9/28/2023 at 5:05 AM

## 2023-09-28 NOTE — PLAN OF CARE
Goal Outcome Evaluation:    Problem: Substance Withdrawal  Goal: Substance Withdrawal  Description: Signs and symptoms of listed problems will be absent or manageable.  Outcome: Progressing     S: pt is a 53 year old male who was admitted from [facility] ED for alcohol detox.    B:Pt reports drinking 1 pint of liquor daily for the last 5 days but has been drinking for years. Last use was just before going to ED. SABRINA was 0.31 at 14:49 and 0.09 at 23:17. Denies hx of seizures & DT. Pt is voluntary. Positive UTOX : positive for THC and cocaine    A: Pt presented with a flat affect, anxious mood. Endorsed anxiety 7/10, PRN Valium given with MSSA assessment helpful, depression 9/10. Denied pain, SI, HI, hallucinations, and contracted for safety. Pt smokes 1/2 pack of cigarettes, will like a patch and gum. MSSA was 8 & 8, received PRN Valium x 2.    R: pt on alcohol monitoring using MSSA with Valium

## 2023-09-28 NOTE — PROGRESS NOTES
09/28/23 0235   Patient Belongings   Did you bring any home meds/supplements to the hospital?  Yes   Disposition of meds  Other (see comment)   Patient Belongings other (see comments)   Belongings Search Yes   Clothing Search Yes   Second Staff Sabrina     Storage Bin: shirt, shoes, pants with belt.   Med-Room Bin: Phone, keys, lip balm, black wireless headphone case with headphones, lighter .  Security Envelope (#[id number]): 5 dollars in cash, 4.03 dollars in coins,  license, Visa card, lottery ticket.   [facility]   ADMISSION:    I am responsible for any personal items that are not sent to the safe or pharmacy. [facility] is not responsible for loss, theft or damage of any property in my possession.    Patient Signature _________________________________________ Date/Time _____________________    Staff Signature ___________________________________________ Date/Time _____________________    2nd Staff person, if patient is unable/unwilling to sign    ________________________________________________________ Date/Time _____________________    DISCHARGE:    All personal items have been returned to me.    Patient Signature _________________________________________ Date/Time _____________________    Staff Signature ___________________________________________ Date/Time _____________________

## 2023-09-28 NOTE — ED NOTES
Called [place] about nurse to nurse report. Was informed that the nurse is dealing with a pt at this time and will call when available.

## 2023-09-28 NOTE — CONSULTS
[facility]  Internal Medicine Consult     John Paul Velazquez MRN# [id number]   Age: 53 year old YOB: 1970     Date of Admission: 9/28/2023  Date of Consult: 9/28/2023    Primary Care Provider: No primary care provider on file.    Requesting Service: Behavioral Health - Basil Doss MD  Reason for Consult: General Medical Evaluation    FYI Patient has another chart listed under MRN: [id number]       SUBJECTIVE   CC:   Alcohol Withdrawal    Assessment and Plan/Recommendations:     John Paul Velazquez is a 53 year old male with PMHx significant alcohol use disorder, HFrEF (EF25%), asthma, HTN who was admitted 09/24/2023 for acute alcohol withdrawal.     Medicine was consulted for medical co-management     Acute alcohol withdrawal   Alcohol withdrawal, hx of alcohol use disorder   MSSA 8 this shift. No hx of withdrawal seizures or Dts. Pt reports drinking 1-2 pints of hard liquor daily. Breath EtOH on arrival was 0.31.   - Continue MSSA   - Folvite, multi-vites, thiamine supplementation   - Further management per Psychiatry   - PRN clonidine ordered for SBP>180 or DBP >110    Transaminitis 2/2 EtOH use disorder   AST 70, ALT 46, Alk Phos 66. Bilirubin 0.8. No significant abdominal pain.  - No need to trend unless pt becomes symptomatic with fever, jaundice, severe abdominal pain, nausea and vomiting.   - Ok to give Tylenol up to 2-3 grams daily for supportive cares   - Repeat CMP in the next few weeks at PCP follow up once pt is outside of acute EtOH withdrawal.     HFrEF, EF 25% 02/2023 per Cardiology note from other chart   HTN  Without acute exacerbation. Per other chart, patient has followed with cardiologist Magdaleno victor last saw of March 2023.  At this appointment he was recommended to start metoprolol 25 mg daily and increase his lisinopril to 10 mg daily.  Per my discussion with patient unclear if he has actually been taking these recommended adjustment to medication and  new medication.  He reports only taking 3 medications for his heart failure prior to admission.  Notes the use of lisinopril as well as his potassium supplement have been very sporadic, and Lasix he was taking every other day. Patient unfortunately has not had close follow-up with his cardiologist due to lack of insurance coverage. Appears euvolemic on exam, maintaining saturations on RA, denies dyspnea.   -Discussed with pharmacy to clarify his medication fills   -Holding PTA lasix 40mg BID for now in the setting of hypokalemia (held for you)  -Resume PTA lisinopril 10mg daily (ordered for you)  -Resume PTA metoprolol XL 25mg daily (ordered for you)  -Recommend having financial counseling meet with patient for discussion of coverage     Hypokalemia  Hypomagnesemia   PTA potassium supplement, but had not been taking consistently prior to admission, however was consistently taking lasix. Suspect this as well as poor intake has been contributing to low potassium. Mg on admission was borderline low, will recheck  -Replaced 40mEq one time now  -Resume PTA potassium 10mEq (ordered for you)  -Recheck Potassium 1400h  -Add on Phos ordered   -Referral for PCP on discharge, patient would like to stay within [facility] who he follows with for heart failure.     Addendum: Follow up K wnl. Will continue with recheck in AM    Tachycardia  Suspect in the setting of acute withdrawal. Asymptomatic   -ECG ordered     Addendum: ECG with sinus tachycardia and PVCs, which patient has a history of, asymptomatic     Asthma  Without acute exacerbation. Patient denies taking daily inhaler at home, but reports using PRN albuterol with exacerbations. Denies wheezing or chest tightness, per second chart, appears patient was prescribed Flovent inhaler following discharge from [facility] hospital in 04/2023. No acute exacerbation   -Start fluticasone-vilanterol in place of PTA Symbicort 1 puff daily   -PRN albuterol     Medicine will continue to follow for  "electrolyte derangements     Kelsey Gallego PA-C  Internal Medicine [facility] Hospitalist  Page job code [phone] (3B), [phone] (3A), or [phone] ([facility] and 4A)  Text paging via [employer] is appreciated  September 28, 2023     HPI:   John Paul Velazquez is a 53 year old male with PMHx significant alcohol use disorder, HFrEF (EF25%), asthma, HTN who was admitted 09/24/2023 for acute alcohol withdrawal.     John Paul states \"I feel like shit\".  Has withdrawal symptoms including tremor, sweats, headache, nausea, diarrhea.  Denies any hallucinations no history of seizures or DTs in the past.  Notes he had not been eating or drinking well prior to admission.  He reports poor intake this morning due to nausea.  Denies vomiting.  Discussed medications with cereal in detail.  Reports that he is only taking 3 medications prior to admission.  Reports his water pill he was taking very regularly, every other day per his report.  He also reports taking a medication for his blood pressure as well as a potassium supplement which he is took very sporadically.  Denies taking any other medications for his heart.  Denies taking any other medications daily for his albuterol.  Tells me he has a history of heart failure and he follows with Magdaleno for his cardiology provider.  He denies any chest pain, palpitations, dyspnea, edema in his bilateral lower extremities.  Denies any asthma-like symptoms including wheezing, chest tightness, cough.  Would like to take a puff of his albuterol though right now.  Mentions that he has not been able to doctor much due to having poor insurance.  Would like to speak with someone with financial to help him get resources for this aspect.  He does not follow with a primary care doctor, he only follows with his cardiologist.  Discussed the benefit of having a primary care doctor which he is open to.  Also discussed electrolyte abnormalities.  Patient is open to having these rechecked.       Past Medical History:   No " "past medical history on file.     Reviewed and updated in [email or web address].     Past Surgical History:    No past surgical history on file.      Social History:         Family History:   No family history on file.      Allergies:   No Known Allergies      Medications:   Reviewed. Please see MAR     Review of Systems:   10 point ROS of systems including Constitutional, Eyes, Respiratory, Cardiovascular, Gastroenterology, Genitourinary, Integumentary, Muscularskeletal, Psychiatric were all negative except for pertinent positives noted in my HPI.    OBJECTIVE   Physical Exam:   Vitals were reviewed  Blood pressure (!) 147/114, pulse 98, temperature 98.4  F (36.9  C), temperature source Oral, resp. rate 16, height 1.651 m (5' 5\"), weight 54.4 kg (120 lb), SpO2 96 %.  General: Alert and oriented x3.  Awake, cooperative, in no acute distress.  EYES: PERRLA, EOM. Anicteric   HENT: Atraumatic.  Normocephalic  Lungs: No increased work of breathing, breathing comfortably on room air, bilateral expiratory wheezing appreciated in bilateral lower lobes, no crackles or rhonchi appreciated  Cardiac: Tachycardic, regular rhythm, normal S1/S2, no murmurs, clicks, rubs, or gallops.  No signs of peripheral edema bilaterally on lower extremities  GI: Abdomen soft, non-tender without rebound or guarding. + Bowel sounds.  Neurologic: No focal deficits, CN II-XII grossly intact  Skin: No jaundice, rashes, or lesions        Data:        Lab Results   Component Value Date     09/27/2023    Lab Results   Component Value Date    CHLORIDE 92 09/27/2023    Lab Results   Component Value Date    BUN 9.7 09/27/2023      Lab Results   Component Value Date    POTASSIUM 3.2 09/27/2023    Lab Results   Component Value Date    CO2 27 09/27/2023    Lab Results   Component Value Date    CR 1.07 09/27/2023        Lab Results   Component Value Date    WBC 8.7 09/27/2023    HGB 17.3 09/27/2023    HCT 49.2 09/27/2023    MCV 90 09/27/2023     09/27/2023 "     Lab Results   Component Value Date    WBC 8.7 09/27/2023

## 2023-09-28 NOTE — DISCHARGE INSTRUCTIONS
Behavioral Discharge Planning and Instructions  THANK YOU FOR CHOOSING [facility]  3A  [phone]    Summary: You were admitted to [facility] on 9/27/23 for detoxification from alcohol.  A medical exam was performed that included lab work. You have met with a  and opted to outpatient treatment.  Please take care and make your recovery a daily priority, John Paul!  It was a pleasure working with you and the entire treatment team here wishes you the very best in your recovery!     Recommendation:    [facility] Outpatient Treatment  [facility] will contact you to schedule intake.  [address] [place] [address]  Phone: [phone]      Main Diagnoses:  Per Dr. Basil Doss MD;  303.90 (F10.20) Alcohol Use Disorder Severe    Major Treatments, Procedures and Findings:  You were treated for alcohol detoxification using Valium. You completed a chemical dependency assessment. You had labs drawn and those results were reviewed with you. Please take a copy of your lab work with you to your next primary care provider appointment.    Symptoms to Report:  If you experience more anxiety, confusion, sleeplessness, deep sadness or thoughts of suicide, notify your treatment team or notify your primary care provider. IF ANY OF THE SYMPTOMS YOU ARE EXPERIENCING ARE A MEDICAL EMERGENCY CALL 911 IMMEDIATELY.     Lifestyle Adjustment: Adjust your lifestyle to get enough sleep, relaxation, exercise and good nutrition. Continue to develop healthy coping skills to decrease stress and promote a sober living environment. Do not use mood altering substances including alcohol, illegal drugs or addictive medications other than what is currently prescribed.     Disposition: Patient is going home first. He will go to Outpatient treatment at [facility] on his own. He verbalizes that he will keep and follow through with this plan.     Facts about COVID19 at www.cdc.gov/COVID19 and www.[place].gov/covid19    Keeping hands  "clean is one of the most important steps we can take to avoid getting sick and spreading germs to others.  Please wash your hands frequently and lather with soap for at least 20 seconds!    Follow-up Appointment:   Patient called his clinic today, at about 1622 and it was closed, he could not schedule a pcp appointment. He verbalized understanding to follow up with a pcp by scheduling an appointment. He said \"I will schedule an appointment tomorrow\". He kept the sheet of paper with the number of [facility].     Recovery apps for your phone to locate current in person and zoom recovery meetings  [place] - meeting james  AA  - meeting james  Meeting guide - meeting james  Quick NA meeting - meeting james  Edna- has various apps    Resources:  Some AA/NA meetings are being held online however most have returned to in-person or a hybrid combination please check online to verify*  Need Support Now? If you or someone you know is struggling or in crisis, help is available. Call or text FluGen or chat Beryllium.org  AA meetings search for them at: https://aa-intergroup.org (worldwide meeting listings)  AA meetings for [place] area can be found online at: https://a[place].org (click local online meetings listings)  NA meetings for [place] area can be found online at: https://www.nam[place].org  (click find a meeting)  AA and NA Sponsors are excellent resources for support and you can find one at any support group meeting.   Alcoholics Anonymous (https://aa.org/): for information 24 hours/day  AA Intergroup service office in [place] (http://www.aastpaul.org/) 249.916.9504  AA Intergroup service office in [facility]: [phone]. (http://www.aamin[place].org/)  Narcotics Anonymous (www.nam[place].org) (935) 685-2040  https://aafairviewriverside.org/meetings  SMART Recovery - self management for addiction recovery:  www.smartrecovery.org  Pathways ~ A Health Crisis Resource & Support Center:  " 367.653.1819.  https://prescribetoprevent.org/patient-education/videos/  http://www.harmreduction.org  [facility] 265-258-3481  Support Group:  AA/NA and Sponsor/support.  National [place] on Mental Illness (www.[place].karime.org): 873.561.5671 or 346-199-4218.  Alcoholics Anonymous (www.alcoholics-anonymous.org): Check your phone book for your local chapter.  Suicide Awareness Voices of Education (SAVE) (www.save.org): 294-452-NFLL (8671)  National Suicide Prevention Line (www.mentalhealthmn.org): 522-752-HPLP (6346)  Mental Health Consumer/Survivor Network of [place] (www.mhcsn.net): 419.425.1394 or 314-448-8210  Mental Health Association of [place] (www.mentalhealth.org): 571.166.5095 or 844-305-1220   Substance Abuse and Mental Health Services (www.samhsa.gov)  [place] Opioid Prevention Coalition: www.opioidcoalition.org    [place] Recovery Connection ([facility])  [facility] connects people seeking recovery to resources that help foster and sustain long-term recovery.  Whether you are seeking resources for treatment, transportation, housing, job training, education, health care or other pathways to recovery, [facility] is a great place to start.  159.281.7839.  www.[place]TigerTrade.eFuelDepot    Great Pod casts for nutrition and wellness  Listen on Apple Podcasts  Dishing Up Nutrition   [employer]   Nutrition       Understand the connection between what you eat and how you feel. Hosted by licensed nutritionists and dietitians from [employer] we share practical, real-life solutions for healthier living through nutrition.     General Medication Instructions:   See your medication sheet(s) for instructions.   Take all medications as prescribed.  Make no changes unless your primary care provider suggests them.   Go to all your primary care provider visits.  Be sure to have all your required lab tests. This way, your medicines can be refilled on time.  Do not use any forms of alcohol.    Please  Note:  If you have any questions at anytime after you are discharged please call [facility] detox unit 3AW at [phone].  [facility], Behavioral Intake [phone]  Medical Records call [phone]  Outpatient Behavioral Intake call [phone]  LP+ Wait List/Bed Availability call [phone]    Please remember to take all of your behavioral discharge planning and lab paperwork to any follow up appointments, it contains your lab results, diagnosis, medication list and discharge recommendations.      THANK YOU FOR CHOOSING [facility]

## 2023-09-28 NOTE — COMMUNITY RESOURCES LIST (ENGLISH)
09/28/2023   [facility]  N/A  For questions about this resource list or additional care needs, please contact your primary care clinic or care manager.  Phone: [phone]   Email: N/A   Address: [address] [address]   Hours: N/A        Substance Use       Outpatient substance use treatment  1  [facility] Distance: 1.2 miles      In-Person, Phone/Virtual   [address]  [place] [address]  Language: English, Russian  Hours: Mon 9:00 AM - 9:00 PM , Tue 9:00 AM - 8:00 PM , Wed - Thu 9:00 AM - 9:00 PM , Fri 9:00 AM - 3:00 PM  Fees: Insurance, Self Pay, Sliding Fee   Phone: (283) 217-2977 Email: intake@Quora Website: https://www.Quora/location/[place]/     2  [facility] Distance: 1.33 miles      In-Person   [address] [address]  Language: English  Hours: Mon - Thu 12:00 PM - 8:30 PM , Fri 10:00 AM - 4:00 PM  Fees: Insurance, Self Pay, State or County Child Welfare Agency   Phone: (914) 331-5116 Email: IOPreferrals@[employer] Website: https://[employer]/project/[place]-[facility]/     Substance use support group  3  Alcoholics Anonymous (AA) - [place] Distance: 6.3 miles      In-Person, Phone/Virtual   [address] [facility] [place] [address]  Language: English  Hours: Mon - Sun Open 24 Hours  Fees: Free   Phone: (284) 429-2963 Email: 14dcm@[facility].org Website: http://www.[facility] Hospitalaa.org/     4  [facility] of Narcotics Anonymous - [facility] - Narcotics Anonymous Distance: 6.93 miles      In-Person, Phone/Virtual   [address] [place] [address]  Language: English  Hours: Mon - Fri 9:00 AM - 5:00 PM  Fees: Free   Email: wrsc@wis[place]na.org Website: https://wisconsinna.org/          Important Numbers & Websites       Emergency Services   911  City Services   311  Poison Control   (228) 788-7922  Suicide Prevention Lifeline   (387)  743-1035 (TALK)  Child Abuse Hotline   (719) 111-2745 (4-A-Child)  Sexual Assault Hotline   (313) 799-1491 (HOPE)  National Runaway Safeline   (756) 762-8940 (RUNAWAY)  All-Options Talkline   (912) 495-2358  Substance Abuse Referral   (703) 540-7969 (HELP)

## 2023-09-28 NOTE — PROGRESS NOTES
"Triage & Transition Services, [address]     John Paul Velazquez  September 28, 2023    Insurance: Patient currently contacting Billing Office to obtain insurance.      Legal Status: Vol     SUDs Assessment Status: Currently working on assessment paperwork.      ROIs on file: None at this time.     Living Situation: Patient lives alone in apartment. Currently unemployed. Patient states he has 6 children in which he has a good relationship with.      Encounter: Patient appears sad and hopeless during session. Patient states he would like more help with mental health issues than substance abuse. Patient states he is too scared to kill himself but would be fine if he drank himself to death stating \"It would make me get into heaven if it was accidental\". Patient contacts for safety to writer on unit. RN notified. Patient interested in inpatient MICD. Patient also willing to accept Psychiatry and Therapy from writer.     Consulted with PIERRE Perkins on Patient s plan of care.     Current Plan: MICD Inpatient. Psychiatry and Individual Therapy.      RN updated.    Amaris Watts  Triage & Transition Services - Mental Health and Addiction Service Line  [address] - Adult Inpatient Addiction Psychiatry Unit          "

## 2023-09-29 LAB
ANION GAP SERPL CALCULATED.3IONS-SCNC: 10 MMOL/L (ref 7–15)
ATRIAL RATE - MUSE: 92 BPM
BUN SERPL-MCNC: 14.3 MG/DL (ref 6–20)
CALCIUM SERPL-MCNC: 8.6 MG/DL (ref 8.6–10)
CHLORIDE SERPL-SCNC: 102 MMOL/L (ref 98–107)
CREAT SERPL-MCNC: 1 MG/DL (ref 0.67–1.17)
DEPRECATED HCO3 PLAS-SCNC: 27 MMOL/L (ref 22–29)
DIASTOLIC BLOOD PRESSURE - MUSE: NORMAL MMHG
EGFRCR SERPLBLD CKD-EPI 2021: >90 ML/MIN/1.73M2
GLUCOSE SERPL-MCNC: 89 MG/DL (ref 70–99)
INTERPRETATION ECG - MUSE: NORMAL
MAGNESIUM SERPL-MCNC: 2.5 MG/DL (ref 1.7–2.3)
P AXIS - MUSE: 67 DEGREES
POTASSIUM SERPL-SCNC: 3.4 MMOL/L (ref 3.4–5.3)
PR INTERVAL - MUSE: 146 MS
QRS DURATION - MUSE: 96 MS
QT - MUSE: 392 MS
QTC - MUSE: 484 MS
R AXIS - MUSE: -61 DEGREES
SODIUM SERPL-SCNC: 139 MMOL/L (ref 135–145)
SYSTOLIC BLOOD PRESSURE - MUSE: NORMAL MMHG
T AXIS - MUSE: 82 DEGREES
VENTRICULAR RATE- MUSE: 92 BPM

## 2023-09-29 PROCEDURE — 80048 BASIC METABOLIC PNL TOTAL CA: CPT | Performed by: PHYSICIAN ASSISTANT

## 2023-09-29 PROCEDURE — 99232 SBSQ HOSP IP/OBS MODERATE 35: CPT | Performed by: PSYCHIATRY & NEUROLOGY

## 2023-09-29 PROCEDURE — 250N000013 HC RX MED GY IP 250 OP 250 PS 637: Performed by: PSYCHIATRY & NEUROLOGY

## 2023-09-29 PROCEDURE — 128N000004 HC R&B CD ADULT

## 2023-09-29 PROCEDURE — 83735 ASSAY OF MAGNESIUM: CPT | Performed by: PHYSICIAN ASSISTANT

## 2023-09-29 PROCEDURE — 250N000013 HC RX MED GY IP 250 OP 250 PS 637: Performed by: PHYSICIAN ASSISTANT

## 2023-09-29 PROCEDURE — H2035 A/D TX PROGRAM, PER HOUR: HCPCS | Mod: HQ

## 2023-09-29 PROCEDURE — 36415 COLL VENOUS BLD VENIPUNCTURE: CPT | Performed by: PHYSICIAN ASSISTANT

## 2023-09-29 PROCEDURE — 250N000013 HC RX MED GY IP 250 OP 250 PS 637: Performed by: REGISTERED NURSE

## 2023-09-29 PROCEDURE — 99207 PR NO BILLABLE SERVICE THIS VISIT: CPT | Performed by: PHYSICIAN ASSISTANT

## 2023-09-29 RX ORDER — MIRTAZAPINE 15 MG/1
15 TABLET, FILM COATED ORAL AT BEDTIME
Status: DISCONTINUED | OUTPATIENT
Start: 2023-09-29 | End: 2023-10-01 | Stop reason: HOSPADM

## 2023-09-29 RX ADMIN — METOPROLOL SUCCINATE 25 MG: 25 TABLET, EXTENDED RELEASE ORAL at 09:03

## 2023-09-29 RX ADMIN — HYDROXYZINE HYDROCHLORIDE 25 MG: 25 TABLET, FILM COATED ORAL at 16:24

## 2023-09-29 RX ADMIN — LISINOPRIL 10 MG: 10 TABLET ORAL at 09:03

## 2023-09-29 RX ADMIN — FUROSEMIDE 40 MG: 40 TABLET ORAL at 20:20

## 2023-09-29 RX ADMIN — MAGNESIUM OXIDE TAB 400 MG (241.3 MG ELEMENTAL MG) 400 MG: 400 (241.3 MG) TAB at 09:03

## 2023-09-29 RX ADMIN — FOLIC ACID 1 MG: 1 TABLET ORAL at 09:04

## 2023-09-29 RX ADMIN — MIRTAZAPINE 15 MG: 15 TABLET, FILM COATED ORAL at 22:39

## 2023-09-29 RX ADMIN — NICOTINE POLACRILEX 2 MG: 2 GUM, CHEWING BUCCAL at 21:41

## 2023-09-29 RX ADMIN — NICOTINE 1 PATCH: 14 PATCH, EXTENDED RELEASE TRANSDERMAL at 09:04

## 2023-09-29 RX ADMIN — DIAZEPAM 10 MG: 5 TABLET ORAL at 20:20

## 2023-09-29 RX ADMIN — MULTIPLE VITAMINS W/ MINERALS TAB 1 TABLET: TAB at 09:03

## 2023-09-29 RX ADMIN — THIAMINE HCL TAB 100 MG 100 MG: 100 TAB at 09:03

## 2023-09-29 RX ADMIN — FLUTICASONE FUROATE AND VILANTEROL TRIFENATATE 1 PUFF: 200; 25 POWDER RESPIRATORY (INHALATION) at 09:07

## 2023-09-29 RX ADMIN — NICOTINE POLACRILEX 2 MG: 2 GUM, CHEWING BUCCAL at 19:59

## 2023-09-29 RX ADMIN — POTASSIUM CHLORIDE 10 MEQ: 750 CAPSULE, EXTENDED RELEASE ORAL at 09:03

## 2023-09-29 RX ADMIN — ALUMINUM HYDROXIDE, MAGNESIUM HYDROXIDE, AND SIMETHICONE 30 ML: 200; 200; 20 SUSPENSION ORAL at 14:28

## 2023-09-29 RX ADMIN — DIAZEPAM 10 MG: 5 TABLET ORAL at 16:24

## 2023-09-29 RX ADMIN — ALBUTEROL SULFATE 2 PUFF: 90 AEROSOL, METERED RESPIRATORY (INHALATION) at 14:29

## 2023-09-29 RX ADMIN — ALBUTEROL SULFATE 2 PUFF: 90 AEROSOL, METERED RESPIRATORY (INHALATION) at 09:08

## 2023-09-29 ASSESSMENT — ACTIVITIES OF DAILY LIVING (ADL)
ADLS_ACUITY_SCORE: 28
ORAL_HYGIENE: INDEPENDENT
ADLS_ACUITY_SCORE: 28
HYGIENE/GROOMING: INDEPENDENT
ADLS_ACUITY_SCORE: 28
DRESS: INDEPENDENT
ADLS_ACUITY_SCORE: 28

## 2023-09-29 NOTE — PLAN OF CARE
Problem: Substance Withdrawal  Goal: Substance Withdrawal  Description: Signs and symptoms of listed problems will be absent or manageable.  Outcome: Progressing   Goal Outcome Evaluation:     Patient slept 7 During the night. MSSA score were 7 and 5 no PRN Was given. Safety checks done every 15 minutes. No concern noted.

## 2023-09-29 NOTE — PROGRESS NOTES
Triage & Transition Services, [address]     Writer met with patient multiple times today and encouraged him to complete assessment paperwork. Writer and patient talked about different options and have decided he is going to Inpatient at [facility] in [facility]. He will need to complete his assessment and have it faxed to [facility]. Writer called and they have a bed available for Monday as of today. Patient informed.    Patient signed ROIs for [facility], [facility], [facility] and [facility]. Assessment will need to be faxed and patient will decide what I best for him.     Plan: [facility] Inpatient    Amaris Watts  Triage & Transition Services - Mental Health and Addiction Service Line  [address] - Adult Inpatient Addiction Psychiatry Unit

## 2023-09-29 NOTE — PLAN OF CARE
Problem: Suicide Risk  Goal: Absence of Self-Harm  Outcome: Progressing   Goal Outcome Evaluation:    Plan of Care Reviewed With: patient          Patient alert and oriented x 4. He appeared tensed and upset. He endorsed anxiety 8/10, depression 8/10, passive SI thoughts without intent and plan, SOB - (SpO2 97% RA, PRN albuterol inhaler given twice this shift- effective per patient). Patient reported that he's annoyed and upset about his current situation. Added that he wanted to go home but doctor recommended for him to stay for a couple more days. Patient appeared agitated while he was talking. He was, however, medication and care compliant. He ate 100% of his breakfast and is taking fluids adequately. He was intermittently present in the milieu.     Patient was in a better mood at lunch time, he was smiling during the conversation. Reported that his anxiety and depression are low. Encouraged patient to increase fluid intake per IM's advise. Patient verbalized understanding.     Patient has new order for blood draw tomorrow morning. Order for lasix resumed.     MSSA 5, 6. Last valium given on 09/28 at 2221 hrs.      PRN maalox given at 1430 hrs for indigestion.

## 2023-09-29 NOTE — PROGRESS NOTES
"[facility], [place]   Psychiatric Progress Note        Interim History:   The patient's care was discussed with the treatment team during the daily team meeting and/or staff's chart notes were reviewed.  Staff report patient is still in withdrawal. Has been depressed and endorsing passive SI. Is open to MICD treatment.     The patient reports that he is feeling better in terms of withdrawal but is still depressed. Is feeling passive SI but this is better. Discussed past antidepressant use and patient has been on Zoloft, Celexa, Wellbutrin, Effexor as well as Seroquel and Risperdal. Was tried on Remeron but admits that he didn't take it very long. Wants to try something that would help with sleep and is willing to try Remeron again. Isn't sure about medications for cravings or Antabuse. Isn't sure about staying through the weekend to get into treatment.          Medications:      fluticasone-vilanterol  1 puff Inhalation Daily    folic acid  1 mg Oral Daily    [Held by provider] furosemide  40 mg Oral BID    lisinopril  10 mg Oral Daily    metoprolol succinate ER  25 mg Oral Daily    multivitamin w/minerals  1 tablet Oral Daily    nicotine  1 patch Transdermal Daily    nicotine   Transdermal Q8H    potassium chloride ER  10 mEq Oral Daily    thiamine  100 mg Oral Daily          Allergies:   No Known Allergies       Labs:     See Epic.          Psychiatric Examination:     BP (!) 128/95 (BP Location: Left arm)   Pulse 97   Temp 97.3  F (36.3  C) (Temporal)   Resp 16   Ht 1.651 m (5' 5\")   Wt 54.4 kg (120 lb)   SpO2 98%   BMI 19.97 kg/m    Weight is 120 lbs 0 oz  Body mass index is 19.97 kg/m .  Orthostatic Vitals       None              Appearance: awake, alert and adequately groomed  Attitude:  somewhat cooperative  Eye Contact:  fair  Mood:  depressed  Affect:  mood congruent  Speech:  clear, coherent  Psychomotor Behavior:  no evidence of tardive dyskinesia, dystonia, or " tics  Thought Process:  goal oriented  Associations:  no loose associations  Thought Content:  passive suicidal ideation present  Insight:  fair  Judgement:  fair  Oriented to:  time, person, and place  Attention Span and Concentration:  intact  Recent and Remote Memory:  fair         Precautions:     Behavioral Orders   Procedures    Code 1 - Restrict to Unit    Routine Programming     As clinically indicated    Status 15     Every 15 minutes.    Withdrawal precautions          Diagnoses:     Alcohol use disorder, severe  Alcohol withdrawal with unspecified complication   MDD, recurrent, moderate to severe without psychosis  Nicotine dependence with current use     Clinically Significant Risk Factors        # Hypokalemia: Lowest K = 2.7 mmol/L in last 2 days, will replace as needed     # Hypomagnesemia: Lowest Mg = 1.5 mg/dL in last 2 days, will replace as needed  # Anion Gap Metabolic Acidosis: Highest Anion Gap = 20 mmol/L in last 2 days, will monitor and treat as appropriate                                            Plan:     1) Continue MSSA protocol.   2) Patient provided information on MAT for AUD.   3) Start Remeron at 15mg at bedtime.   4) Patient considering residential MICD treatment.       Disposition Plan   Reason for ongoing admission: poses an imminent risk to self and requires detoxification from substance that poses a risk of bodily harm during withdrawal period  Discharge location: Chemical dependency treatment facility  Discharge Medications: not ordered  Follow-up Appointments: not scheduled  Legal Status: voluntary    Entered by: Basil Doss MD on September 29, 2023 at 11:35 AM

## 2023-09-29 NOTE — PROGRESS NOTES
Brief Hospital Medicine Note:     John Paul Velazquez is a 53 year old male with PMHx significant alcohol use disorder, HFrEF (EF25%), asthma, HTN who was admitted 09/24/2023 for acute alcohol withdrawal.     Medicine team following up on BMP. Potassium improved. Have resumed PTA lasix. Will recheck K in AM to ensure it remains stable.     Nursing notes, vitals, and labs reviewed.    Medicine will continue to follow up on BMP in AM    Kelsey Loja PA-C  Hospitalist Service  Contact information available via [facility] Paging/Directory

## 2023-09-29 NOTE — CARE PLAN
09/29/23 1841   Group Therapy Session   Group Attendance attended group session   Time Session Began 1645   Time Session Ended 1745   Total Time (minutes) 60   Total # Attendees 6   Group Type psychotherapeutic;addiction   Group Topic Covered coping skills/lifestyle management;relapse prevention;disease of addiction/choices in recovery;emotions/expression;co-occurring illness   Group Session Detail CTC-Group members completed/discussed a worksheet packet on change, reasons of your addiction, triggers and reasons to quit.   Patient Response/Contribution expressed understanding of topic;listened actively;discussed personal experience with topic;cooperative with task;expressed readiness to alter behaviors   Patient Participation Detail Patient presented to group was pleasant, engaged, and checked in feeling like crap. Pt talked after group wanted information about local treatment programming.

## 2023-09-30 LAB
ANION GAP SERPL CALCULATED.3IONS-SCNC: 8 MMOL/L (ref 7–15)
BUN SERPL-MCNC: 16.8 MG/DL (ref 6–20)
CALCIUM SERPL-MCNC: 8.7 MG/DL (ref 8.6–10)
CHLORIDE SERPL-SCNC: 104 MMOL/L (ref 98–107)
CREAT SERPL-MCNC: 1.09 MG/DL (ref 0.67–1.17)
DEPRECATED HCO3 PLAS-SCNC: 30 MMOL/L (ref 22–29)
EGFRCR SERPLBLD CKD-EPI 2021: 82 ML/MIN/1.73M2
GLUCOSE SERPL-MCNC: 86 MG/DL (ref 70–99)
POTASSIUM SERPL-SCNC: 4.6 MMOL/L (ref 3.4–5.3)
SODIUM SERPL-SCNC: 142 MMOL/L (ref 135–145)

## 2023-09-30 PROCEDURE — 99232 SBSQ HOSP IP/OBS MODERATE 35: CPT | Performed by: PSYCHIATRY & NEUROLOGY

## 2023-09-30 PROCEDURE — 80048 BASIC METABOLIC PNL TOTAL CA: CPT | Performed by: PHYSICIAN ASSISTANT

## 2023-09-30 PROCEDURE — 250N000013 HC RX MED GY IP 250 OP 250 PS 637: Performed by: REGISTERED NURSE

## 2023-09-30 PROCEDURE — 128N000004 HC R&B CD ADULT

## 2023-09-30 PROCEDURE — 250N000013 HC RX MED GY IP 250 OP 250 PS 637: Performed by: PSYCHIATRY & NEUROLOGY

## 2023-09-30 PROCEDURE — 250N000013 HC RX MED GY IP 250 OP 250 PS 637: Performed by: PHYSICIAN ASSISTANT

## 2023-09-30 PROCEDURE — 36415 COLL VENOUS BLD VENIPUNCTURE: CPT | Performed by: PHYSICIAN ASSISTANT

## 2023-09-30 PROCEDURE — 99207 PR NO BILLABLE SERVICE THIS VISIT: CPT | Performed by: PHYSICIAN ASSISTANT

## 2023-09-30 RX ADMIN — NICOTINE POLACRILEX 2 MG: 2 GUM, CHEWING BUCCAL at 21:18

## 2023-09-30 RX ADMIN — ALBUTEROL SULFATE 2 PUFF: 90 AEROSOL, METERED RESPIRATORY (INHALATION) at 08:27

## 2023-09-30 RX ADMIN — NICOTINE POLACRILEX 2 MG: 2 GUM, CHEWING BUCCAL at 11:31

## 2023-09-30 RX ADMIN — FOLIC ACID 1 MG: 1 TABLET ORAL at 08:20

## 2023-09-30 RX ADMIN — ALUMINUM HYDROXIDE, MAGNESIUM HYDROXIDE, AND SIMETHICONE 30 ML: 200; 200; 20 SUSPENSION ORAL at 22:00

## 2023-09-30 RX ADMIN — METOPROLOL SUCCINATE 25 MG: 25 TABLET, EXTENDED RELEASE ORAL at 08:20

## 2023-09-30 RX ADMIN — MIRTAZAPINE 15 MG: 15 TABLET, FILM COATED ORAL at 21:14

## 2023-09-30 RX ADMIN — FUROSEMIDE 40 MG: 40 TABLET ORAL at 19:22

## 2023-09-30 RX ADMIN — LISINOPRIL 10 MG: 10 TABLET ORAL at 08:20

## 2023-09-30 RX ADMIN — DIAZEPAM 10 MG: 5 TABLET ORAL at 11:31

## 2023-09-30 RX ADMIN — TRAZODONE HYDROCHLORIDE 50 MG: 50 TABLET ORAL at 21:18

## 2023-09-30 RX ADMIN — POTASSIUM CHLORIDE 10 MEQ: 750 CAPSULE, EXTENDED RELEASE ORAL at 08:20

## 2023-09-30 RX ADMIN — TRAZODONE HYDROCHLORIDE 50 MG: 50 TABLET ORAL at 19:22

## 2023-09-30 RX ADMIN — ALUMINUM HYDROXIDE, MAGNESIUM HYDROXIDE, AND SIMETHICONE 30 ML: 200; 200; 20 SUSPENSION ORAL at 04:54

## 2023-09-30 RX ADMIN — FUROSEMIDE 40 MG: 40 TABLET ORAL at 08:20

## 2023-09-30 RX ADMIN — MULTIPLE VITAMINS W/ MINERALS TAB 1 TABLET: TAB at 08:20

## 2023-09-30 RX ADMIN — FLUTICASONE FUROATE AND VILANTEROL TRIFENATATE 1 PUFF: 200; 25 POWDER RESPIRATORY (INHALATION) at 08:26

## 2023-09-30 RX ADMIN — NICOTINE 1 PATCH: 14 PATCH, EXTENDED RELEASE TRANSDERMAL at 08:20

## 2023-09-30 RX ADMIN — NICOTINE POLACRILEX 2 MG: 2 GUM, CHEWING BUCCAL at 19:22

## 2023-09-30 RX ADMIN — NICOTINE POLACRILEX 2 MG: 2 GUM, CHEWING BUCCAL at 14:24

## 2023-09-30 RX ADMIN — THIAMINE HCL TAB 100 MG 100 MG: 100 TAB at 08:20

## 2023-09-30 ASSESSMENT — ACTIVITIES OF DAILY LIVING (ADL)
ADLS_ACUITY_SCORE: 28
HYGIENE/GROOMING: INDEPENDENT
DRESS: INDEPENDENT
ADLS_ACUITY_SCORE: 28
ORAL_HYGIENE: INDEPENDENT
DRESS: INDEPENDENT
HYGIENE/GROOMING: INDEPENDENT
ADLS_ACUITY_SCORE: 28
ORAL_HYGIENE: INDEPENDENT
ADLS_ACUITY_SCORE: 28

## 2023-09-30 NOTE — PROGRESS NOTES
Met with client to complete comp assessment. Patient reported he wants to stay in [place] for Tx. He reported concerns about losing his apartment and is unsure if he wants residential treatment, reporting he wants to work. Patient was encouraged to contact his landlord to see if he would be able to work out a payment plan for October rent and possibly attend OP so that he can work. He reported he is not sure at this time what he wants to do and reported he needed time to think about it. Writer let patient know I would be in the office and to take the day to think about it and we would discuss it more tomorrow.

## 2023-09-30 NOTE — PROGRESS NOTES
"[facility], [place]   Psychiatric Progress Note        Interim History:   The patient's care was discussed with the treatment team during the daily team meeting and/or staff's chart notes were reviewed.  Staff report patient is still in withdrawal. Can likely go to CD treatment on Monday.     The patient reports that he is \"so-so.\" Didn't sleep well again but feels it is due to withdrawal. Is eating a little better. Still depressed but feels a little better. Says that he still has suicidal thoughts \"every once in a while.\" Open to using Antabuse at discharge. Open to CD treatment.          Medications:      fluticasone-vilanterol  1 puff Inhalation Daily    folic acid  1 mg Oral Daily    furosemide  40 mg Oral BID    lisinopril  10 mg Oral Daily    metoprolol succinate ER  25 mg Oral Daily    mirtazapine  15 mg Oral At Bedtime    multivitamin w/minerals  1 tablet Oral Daily    nicotine  1 patch Transdermal Daily    nicotine   Transdermal Q8H    potassium chloride ER  10 mEq Oral Daily    thiamine  100 mg Oral Daily          Allergies:   No Known Allergies       Labs:     See Epic.          Psychiatric Examination:     BP (!) 134/94 (BP Location: Left arm, Patient Position: Sitting, Cuff Size: Adult Regular)   Pulse 96   Temp 98.5  F (36.9  C) (Oral)   Resp 16   Ht 1.651 m (5' 5\")   Wt 54.4 kg (120 lb)   SpO2 91%   BMI 19.97 kg/m    Weight is 120 lbs 0 oz  Body mass index is 19.97 kg/m .  Orthostatic Vitals       None              Appearance: awake, alert and adequately groomed  Attitude:  more cooperative  Eye Contact:  good  Mood:  better  Affect:  mood congruent  Speech:  clear, coherent  Psychomotor Behavior:  no evidence of tardive dyskinesia, dystonia, or tics  Thought Process:  goal oriented  Associations:  no loose associations  Thought Content:  passive suicidal ideation present and improving  Insight:  fair  Judgement:  fair  Oriented to:  time, person, and " place  Attention Span and Concentration:  intact  Recent and Remote Memory:  fair         Precautions:     Behavioral Orders   Procedures    Code 1 - Restrict to Unit    Routine Programming     As clinically indicated    Status 15     Every 15 minutes.    Withdrawal precautions          Diagnoses:     Alcohol use disorder, severe  Alcohol withdrawal with unspecified complication   MDD, recurrent, moderate to severe without psychosis  Nicotine dependence with current use             Plan:     1) Continue MSSA protocol.   2) Patient provided information on MAT for AUD. Open to using Antabuse.   3) Continue Remeron at 15mg at bedtime.   4) Patient open to residential MICD treatment.       Disposition Plan   Reason for ongoing admission: poses an imminent risk to self and requires detoxification from substance that poses a risk of bodily harm during withdrawal period  Discharge location: Chemical dependency treatment facility  Discharge Medications: not ordered  Follow-up Appointments: not scheduled  Legal Status: voluntary    Entered by: Basil Doss MD on September 30, 2023 at 10:39 AM

## 2023-09-30 NOTE — PROGRESS NOTES
"  Unit 3A    UNIVERSAL ADULT DIAGNOSTIC ASSESSMENT - Substance Use Disorder    Provider Name and Credentials: PIERRE Perkins    PATIENT'S NAME: John Paul Velazquez  PREFERRED NAME: John Paul  PRONOUNS:       MRN: [id number]  : 1970   [id number]  DATE OF SERVICE: 2023   START TIME: 9:30 am  END TIME: 10:30 am  PREFERRED PHONE: [phone]  EMAIL: No e-mail address on [email or web address]  May we leave a program related message: Yes  SERVICE MODALITY:  In-person      Identifying Information:  Patient is a 53 year old,  male who was referred for an assessment by self. The pronoun use throughout this assessment reflects the patient's chosen pronoun. Patient attended the session alone.     Chief Complaint:   The reason for seeking services at this time is: \"I simply need help\"  The problem(s) began 15. Patient has attempted to resolve these concerns in the past through patient reported he attempted treatment years ago .  Patient does not appear to be in severe withdrawal, an imminent safety risk to self or others, or requiring immediate medical attention and may proceed with the assessment interview.    Social/Family History:  Patient reported he grew up in [place]. Patient was raised by mother and father. Patient reported that his childhood was \"growing up could have been better.  Patient describes current relationships with family of origin as negative, positive and nonexistent.      The patient describes his cultural background as black and white.  Cultural influences and impact on patient's life structure, values, norms, and healthcare:  NA .  Contextual influences on patient's health include: Contextual Factors: Individual Factors NA .  Patient identified his preferred language to be English. Patient reported he does not need the assistance of an  or other support involved in therapy.     Patient reported had no significant delays in " developmental tasks.  Patient's highest education level was GED. Patient identified the following learning problems: none reported.  Patient reports he is able to understand written materials.    Patient's current relationship status is single for 6 months.   Patient identified his sexual orientation as heterosexual.  Patient reported having six child(katie).     Patient's current living/housing situation involves staying in own home/apartment.  Patient lives with alone and he reports that housing is not stable. Patient identified  relatives  as part of his support system.  Patient identified the quality of these relationships as fair.      Patient reports he is involved in community of ann activities. Patients reports spirituality impacts his recovery in the following ways:  I think it would be helpful if I kept the ann and trusted God more.     Patient reports engaging in the following recreational/leisure activities: patient denied leisure activities. Patient reports engaging in the following recreation/leisure activities while using: I tend to stay to stay in the house. Patient reports the following people are supportive of recovery: relatives.  Patient is currently unemployed.  Patient reports his income is obtained through  patient reports having no financial resources .  Patient does identify finances as a current stressor. Cultural and socioeconomic factors do not affect the patient's access to services.    Patient denies substance related arrests or legal issues.  Patient denies being on probation / parole / under the jurisdiction of the court.    Patient's Strengths and Limitations:  Patient identified the following strengths or resources that will help him succeed in treatment: housing. Things that may interfere with the patient's success in treatment include: housing instability.     Assessments:  The following assessments were completed by patient for this visit:  PROMIS 10-Global Health (all  questions and answers displayed):       9/30/2023     9:00 AM   PROMIS 10   In general, would you say your health is: 1   In general, would you say your quality of life is: 1   In general, how would you rate your physical health? 1   In general, how would you rate your mental health, including your mood and your ability to think? 2   In general, how would you rate your satisfaction with your social activities and relationships? 1   In general, please rate how well you carry out your usual social activities and roles. (This includes activities at home, at work and in your community, and responsibilities as a parent, child, spouse, employee, friend, etc.) 2   To what extent are you able to carry out your everyday physical activities such as walking, climbing stairs, carrying groceries, or moving a chair? 4   In the past 7 days, how often have you been bothered by emotional problems such as feeling anxious, depressed, or irritable? 5   In the past 7 days, how would you rate your fatigue on average? 4   In the past 7 days, how would you rate your pain on average, where 0 means no pain, and 10 means worst imaginable pain? 8   Global Mental Health Score 5   Global Physical Health Score 9   PROMIS TOTAL - SUBSCORES 14     GAIN-sliding scale:      9/30/2023     9:00 AM   When was the last time that you had significant problems...   with feeling very trapped, lonely, sad, blue, depressed or hopeless about the future? 1+ years ago   with sleep trouble, such as bad dreams, sleeping restlessly, or falling asleep during the day? 1+ years ago   with feeling very anxious, nervous, tense, scared, panicked or like something bad was going to happen? 1+ years ago   with becoming very distressed & upset when something reminded you of the past? 1+ years ago   with thinking about ending your life or committing suicide? 1+ years ago          9/30/2023     9:00 AM   When was the last time that you did the following things 2 or more times?    Lied or conned to get things you wanted or to avoid having to do something? 1+ years ago   Had a hard time paying attention at school, work or home? 1+ years ago   Had a hard time listening to instructions at school, work or home? 1+ years ago   Were a bully or threatened other people? 1+ years ago   Started physical fights with other people? 1+ years ago       Personal and Family Medical History:   Patient did not report a family history of mental health concerns.  Patient reports the following family history: NA  No family history on file.     Patient reported the following previous mental health diagnoses: PTSD, social anxiety.  Patient reports his primary mental health symptoms include: people getting loud, hearing gunshots and I don't like people and these do not impact his ability to function.   Patient has received mental health services in the past: I had mental health services when I lived in [place] in 2009.  Psychiatric Hospitalizations: None.  Patient denies a history of civil commitment.  Current mental health services/providers include:  None.    Patient has not had a physical exam to rule out medical causes for current symptoms.  Date of last physical exam was within the past year. Client was encouraged to follow up with PCP if symptoms were to develop. The patient has a non-[facility] Primary Care Provider. Their PCP is at [facility].. Patient reports  my heart and asthma .  Patient denies any issues with pain.   There are significant appetite / nutritional concerns / weight changes. Patient does not report a history of an eating disorder. Patient does not report a history of head injury / trauma / cognitive impairment.      Patient reports current meds as:   Outpatient Medications Marked as Taking for the 9/28/23 encounter (Hospital Encounter)   Medication Sig    budesonide-formoterol (SYMBICORT) 160-4.5 MCG/ACT Inhaler Inhale 2 puffs into the lungs two times daily Last filled April 2023 for 30 days supply     furosemide (LASIX) 40 MG tablet Take 40 mg by mouth 2 times daily Last filled August 2023 for 30 days supply    lisinopril (ZESTRIL) 10 MG tablet Take 10 mg by mouth daily Last filled April 2023       Medication Adherence:  Patient reports  he does not take medication as prescribed outside of a controlled environment  .    Patient Allergies:  No Known Allergies    Medical History:  No past medical history on file.    Substance Use:  Patient reported the following biological family members or relatives with chemical health issues:  my father was an alcoholic and addicted to drugs.. Patient  reports he attended treatment years ago . Patient has been to detox. Patient is not currently receiving any chemical dependency treatment. Patient reports they have attended the following support groups: 12 step meetings in the past.        Substance Age of first use Pattern and duration of use (include amounts and frequency) Date of last use     Withdrawal potential Route of administration   Has  used Alcohol 15 1-2 pints of neha daily 9/27/23 No oral   Has  used Marijuana   15 2-3 joints 9/27/23 No smoked     Has not used Amphetamines          Has  used Cocaine/ crack    15 1 time a week, smoking or snorting 1/2-1 gram each use 9/20/23 No smoked and snorted   Has not used Hallucinogens        Has not used Inhalants        Has not used Heroin        Has not used Other Opiates        Has not used Benzodiazepine          Has not used Barbiturates        Has not used Over the counter meds.        Has not used Caffeine        Has not used Nicotine         Has not used other substances not listed above:  Identify:              Patient reported the following problems as a result of their substance use: financial problems, occupational / vocational problems, and relationship problems.  Patient is concerned about substance use.     Patient reports experiencing the following withdrawal symptoms within the past 12 months: none and the  "following within the past 30 days: sweating, shaky/jittery/tremors, unable to sleep, agitation, fatigue, sad/depressed feeling, vivid/unpleasant dreams, irritability, high blood pressure, dizziness, seizures, diarrhea, unable to eat, and anxiety/worry.   Patients reports urges to use Alcohol and Cannabis/ Hashish.  Patient reports he has used more Alcohol and Cannabis/ Hashish than intended and over a longer period of time than intended. Patient reports he has not had unsuccessful attempts to cut down or control use of Alcohol and Cannabis/ Hashish.  Patient reports longest period of abstinence was patient denies any periods of sobriety and return to use was due to NA. Patient reports he has needed to use more Alcohol and Cannabis/ Hashish to achieve the same effect.  Patient does not report diminished effect with use of same amount of Alcohol and Cannabis/ Hashish.     Patient does  report a great deal of time is spent in activities necessary to obtain, use, or recover from Alcohol and Cannabis/ Hashish effects.  Patient does  report important social, occupational, or recreational activities are given up or reduced because of Alcohol and Cannabis/ Hashish use.  Alcohol and Cannabis/ Hashish use is continued despite knowledge of having a persistent or recurrent physical or psychological problem that is likely to have caused or exacerbated by use.  Patient reports the following problem behaviors while under the influence of substances \"to many to describe.\"     Patient reports his recovery goals are to stay sober.     Patient reports substance use has not impacted his ability to function in a school setting. Patient reports substance use has impacted his ability to function in a work setting.  Patients demographics and history impact his recovery in the following ways:  \"nothing that I can think of.\"     Patient does have a history of gambling concerns and/or treatment . Patient does  have other addictive behaviors " he is concerned about gambling.         Significant Losses / Trauma / Abuse / Neglect Issues:   Patient did not serve in the .  There are indications or report of significant loss, trauma, abuse or neglect issues related to: death of mother and sister .  Concerns for possible neglect    .     Safety Assessment:   Current Safety Concerns:  [place] Suicide Severity Rating Scale (Short Version)      9/27/2023     2:41 PM 9/28/2023     2:25 AM   [place] Suicide Severity Rating (Short Version)   Over the past 2 weeks have you felt down, depressed, or hopeless? yes    Over the past 2 weeks have you had thoughts of killing yourself? yes    Q1 Wished to be Dead (Past Month)  no   Q2 Suicidal Thoughts (Past Month)  no   Q3 Suicidal Thought Method  no   Q4 Suicidal Intent without Specific Plan  no   Q5 Suicide Intent with Specific Plan  no   Q6 Suicide Behavior (Lifetime)  no   Level of Risk per Screen  low risk     Patient denies current homicidal ideation and behaviors.  Patient denies current self-injurious ideation and behaviors.    Patient denied risk behaviors associated with substance use.  Patient denies any high risk behaviors associated with mental health symptoms.  Patient reports the following current concerns for their personal safety: None.  Patient reports there are firearms in the house. The firearms are secured in a locked space.     History of Safety Concerns:  Patient denied a history of homicidal ideation.     Patient denied a history of personal safety concerns.    Patient denied a history of assaultive behaviors.    Patient denied a history of sexual assault behaviors.     Patient denied a history of risk behaviors associated with substance use.  Patient denies any history of high risk behaviors associated with mental health symptoms.  Patient reports the following protective factors:  patient denied any protective factors.    Risk Plan:  See Recommendations for Safety and Risk Management  Plan    Review of Symptoms per patient report:  Substance Use:  blackouts, passing out, vomiting, hangovers, daily use, substance use at work, work absence due to substance use, social problems related to substance use, and cravings/urges to use     Collateral Contact Summary:   Collateral contacts contributing to this assessment:  None    If court related records were reviewed, summarize here: NA    Information from collateral contacts supported/largely agreed with information from the client and associated risk ratings.    Information in this assessment was obtained from the medical record and provided by patient who is a fair historian.    Patient will have open access to their mental health medical record.    Diagnostic Criteria: 1.) Alcohol/drug is often taken in larger amounts or over a longer period than was intended.  Met for Alcohol and Cannabis.  2.) There is a persistent desire or unsuccessful efforts to cut down or control alcohol/drug use.  Met for Alcohol and Cannabis.  3.) A great deal of time is spent in activities necessary to obtain alcohol, use alcohol, or recover from its effects.  Met for Alcohol and Cannabis.  4.) Craving, or a strong desire or urge to use alcohol/drug.  Met for Alcohol and Cannabis.  5.) Recurrent alcohol/drug use resulting in a failure to fulfill major role obligations at work, school or home.  Met for Alcohol and Cannabis.  6.) Continued alcohol use despite having persistent or recurrent social or interpersonal problems caused or exacerbated by the effects of alcohol/drug.  Met for Alcohol and Cannabis.  7.) Important social, occupational, or recreational activities are given up or reduced because of alcohol/drug use.  Met for Alcohol and Cannabis.  9.) Alcohol/drug use is continued despite knowledge of having a persistent or recurrent physical or psychological problem that is likely to have been caused or exacerbated by alcohol.  Met for Alcohol and Cannabis.  11.)  Withdrawal, as manifested by either of the following: The characteristic withdrawal syndrome for alcohol/drug (refer to Criteria A and B of the criteria set for alcohol/drug withdrawal).. Met for Alcohol and Cannabis.     As evidenced by self report and criteria, client meets the following DSM5 Diagnoses:   (Sustained by DSM5 Criteria Listed Above)  Alcohol Use Disorder   303.90 (F10.20) Severe In a controlled environment  304.30 (F12.20) Cannabis Use Disorder Severe  In a controlled environment  Stimulant Use Disorder:  In a controlled environment, Specify current severity:  Moderate  304.20 (F14.20) Cocaine.    Recommendations:     1. Plan for Safety and Risk Management:  Recommended that patient call 911 or go to the local ED should there be a change in any of these risk factors..      Report to child / adult protection services was NA.     2. DANIELLE Referrals:   Recommendations:  Residential Treatment.  Patient reports they are willing to follow these recommendations.     Patient would like the following family or other support people involved in their treatment: patient declined. Patient does not have a history of opiate use.    3. Mental Health Referrals:  Patient would benefit from obtaining a psychiatrist and therapist in the community.      4. Patient identified no cultural concerns that need to be addressed in treatment.NA    5. Recommendations for treatment focus:   Depressed Mood -    Alcohol / Substance Use -   .     Clinical Substantiation:  Summary: Discussed with pt their desired outcome; reviewed living situation and community supports; reviewed type of use and risk factors for continued use. Risk ratings/justification below:   Dimension 1 -  Acute Intoxication/Withdrawal: 0 - No Problem    Dimension 2 - Biomedical: 1 - Minor Problem    Dimension 3 - Emotional/Behavioral/Cognitive Conditions: 2 - Moderate Problem    Dimension 4 - Readiness to Change:  2 - Moderate Problem    Dimension 5 -  Relapse/Continued Use/ Continued Problem Potential: 3 - Severe Problem    Dimension 6 - Recovery Environment:  3 - Severe Problem   -     Placement/Program/Barriers Identified: None    Referral: Outpatient treatment at [facility] Assessment ID: [id number]    Provider Name/ Credentials:  PIERRE Perkins  September 30, 2023

## 2023-09-30 NOTE — PLAN OF CARE
"  Problem: Adult Behavioral Health Plan of Care  Goal: Plan of Care Review  Outcome: Progressing   Goal Outcome Evaluation:                      Patient alert and oriented. He woke up, had his breakfast. Vitals wnl BP (!) 134/94 (BP Location: Left arm, Patient Position: Sitting, Cuff Size: Adult Regular)   Pulse 96   Temp 98.5  F (36.9  C) (Oral)   Resp 16   Ht 1.651 m (5' 5\")   Wt 54.4 kg (120 lb)   SpO2 91%   BMI 19.97 kg/m    Patient denied symptoms of withdrawal, noting he needs a shower only because he feels like he had quite a bit of seating before  he woke up, however, none seen on him. He said since he just got up this morning, he will let writer know later if he feels any different.  He is engaged, bright affect, pleasant   MSSA scores of 5 and 8-prn valium 10 mg administered with nicotine gum. Patient noted that he does not feel well and has been sweating quite a bit. He says, he does feel better than when he came in. Patient reports having big lifetime decisions to make which are hard to phantom. Writer encouraged patient noting that he took the appropriate steps to get help, stay sober and get a better life. He is hoping that he stay within his plan and get treatment. He expressed that he knows, if he leaves here today or tomorrow, he will go right back to drinking alcohol, therefore, he needs treatment.   Patient remains stable and safe on unit. Vitals noted at BP (!) 129/91 (BP Location: Left arm)   Pulse 93   Temp 97.6  F (36.4  C) (Temporal)   Resp 18   Ht 1.651 m (5' 5\")   Wt 54.4 kg (120 lb)   SpO2 91%   BMI 19.97 kg/m      Patient has been out in the milieu with peers watching television / movies. He remains engaged and social. No further concerns.   "

## 2023-09-30 NOTE — PLAN OF CARE
"  Problem: Adult Behavioral Health Plan of Care  Goal: Plan of Care Review  9/30/2023 1818 by Jyoti Blackburn, RN  Outcome: Progressing  Flowsheets (Taken 9/30/2023 1802)  Patient Agreement with Plan of Care: agrees  9/30/2023 1046 by Jyoti Blackburn, RN  Outcome: Progressing   Goal Outcome Evaluation:    Plan of Care Reviewed With: patient                 Patient alert and oriented on approach. No psych symptoms (SI, HI, hallucinations, depression) noted, still has some sweatiness reported due to withdrawal, however, patient MSSA scores were 5 and 5 this shift, no valium administered. No pain reported and patient has been calm, engaged, social, bright affect noted and has been cooperative and compliant with medications and cares. He showered and changed his bedding this shift. Vitals /82 (BP Location: Left arm, Patient Position: Sitting, Cuff Size: Adult Regular)   Pulse 97   Temp 98.5  F (36.9  C) (Oral)   Resp 18   Ht 1.651 m (5' 5\")   Wt 54.4 kg (120 lb)   SpO2 98%   BMI 19.97 kg/m       Patient remains safe on unit and has been able to cope with mild anxiety due to big decisions he has to make regarding treatment and his future. Patient went to bed at about 2227. No further complaints noted.   "

## 2023-09-30 NOTE — PROGRESS NOTES
Brief Hospital Medicine Note:     John Paul Velazquez is a 53 year old male with PMHx significant alcohol use disorder, HFrEF (EF25%), asthma, HTN who was admitted 09/24/2023 for acute alcohol withdrawal.        Medicine team following up on BMP. Potassium remains stable following resumption of lasix. Recommend recheck of BMP with PCP on discharge. Of note, patient will need referral for PCP on discharge.     Nursing notes, vitals, and labs reviewed.      Medicine will sign off at this time. Thank you for the consult. If further questions arise, please reach out to Medicine Team.    Kelsey Loja PA-C  Hospitalist Service  Contact information available via [facility] Paging/Directory

## 2023-09-30 NOTE — PLAN OF CARE
Goal Outcome Evaluation:    Problem: Alcohol Withdrawal  Goal: Alcohol Withdrawal Symptom Control  Outcome: Progressing  MSSA scores of 5/6, patient did not require medication for alcohol withdrawal and is observed to sleep throughout the noc.

## 2023-09-30 NOTE — PLAN OF CARE
"  Problem: Alcohol Withdrawal  Goal: Alcohol Withdrawal Symptom Control  Outcome: Progressing     Patient continues on alcohol withdrawal  monitoring this evening. Patient scored 8 & 8 and received 10 mg of valium x 2  with reported effect each time. Patient was able to attend the psycho Therapy group this evening. Patient ate a good dinner and was encouraged to drink more fluid. He denied SI/SIB/HI and all forms of hallucinations and contracted to be safe. All his vital signs were at base line.  /87 (BP Location: Left arm)   Pulse 92   Temp 99.2  F (37.3  C) (Oral)   Resp 16   Ht 1.651 m (5' 5\")   Wt 54.4 kg (120 lb)   SpO2 100%   BMI 19.97 kg/m     "

## 2023-10-01 VITALS
WEIGHT: 120 LBS | BODY MASS INDEX: 19.99 KG/M2 | RESPIRATION RATE: 16 BRPM | OXYGEN SATURATION: 99 % | HEART RATE: 83 BPM | HEIGHT: 65 IN | TEMPERATURE: 98.5 F | DIASTOLIC BLOOD PRESSURE: 77 MMHG | SYSTOLIC BLOOD PRESSURE: 112 MMHG

## 2023-10-01 PROCEDURE — 99239 HOSP IP/OBS DSCHRG MGMT >30: CPT | Performed by: PSYCHIATRY & NEUROLOGY

## 2023-10-01 PROCEDURE — 250N000013 HC RX MED GY IP 250 OP 250 PS 637: Performed by: PHYSICIAN ASSISTANT

## 2023-10-01 PROCEDURE — 250N000013 HC RX MED GY IP 250 OP 250 PS 637: Performed by: PSYCHIATRY & NEUROLOGY

## 2023-10-01 PROCEDURE — 250N000013 HC RX MED GY IP 250 OP 250 PS 637: Performed by: REGISTERED NURSE

## 2023-10-01 PROCEDURE — G0177 OPPS/PHP; TRAIN & EDUC SERV: HCPCS

## 2023-10-01 RX ORDER — MIRTAZAPINE 15 MG/1
15 TABLET, FILM COATED ORAL AT BEDTIME
Qty: 30 TABLET | Refills: 0 | Status: SHIPPED | OUTPATIENT
Start: 2023-10-01

## 2023-10-01 RX ORDER — METOPROLOL SUCCINATE 25 MG/1
25 TABLET, EXTENDED RELEASE ORAL DAILY
Qty: 30 TABLET | Refills: 0 | Status: SHIPPED | OUTPATIENT
Start: 2023-10-01

## 2023-10-01 RX ORDER — MULTIPLE VITAMINS W/ MINERALS TAB 9MG-400MCG
1 TAB ORAL DAILY
Qty: 30 TABLET | Refills: 0 | Status: SHIPPED | OUTPATIENT
Start: 2023-10-02

## 2023-10-01 RX ORDER — FOLIC ACID 1 MG/1
1 TABLET ORAL DAILY
Qty: 30 TABLET | Refills: 0 | Status: SHIPPED | OUTPATIENT
Start: 2023-10-02

## 2023-10-01 RX ORDER — PANTOPRAZOLE SODIUM 40 MG/1
40 TABLET, DELAYED RELEASE ORAL
Status: DISCONTINUED | OUTPATIENT
Start: 2023-10-01 | End: 2023-10-01 | Stop reason: HOSPADM

## 2023-10-01 RX ADMIN — PANTOPRAZOLE SODIUM 40 MG: 40 TABLET, DELAYED RELEASE ORAL at 12:13

## 2023-10-01 RX ADMIN — ALUMINUM HYDROXIDE, MAGNESIUM HYDROXIDE, AND SIMETHICONE 30 ML: 200; 200; 20 SUSPENSION ORAL at 01:38

## 2023-10-01 RX ADMIN — FLUTICASONE FUROATE AND VILANTEROL TRIFENATATE 1 PUFF: 200; 25 POWDER RESPIRATORY (INHALATION) at 08:40

## 2023-10-01 RX ADMIN — MULTIPLE VITAMINS W/ MINERALS TAB 1 TABLET: TAB at 08:42

## 2023-10-01 RX ADMIN — NICOTINE POLACRILEX 2 MG: 2 GUM, CHEWING BUCCAL at 14:34

## 2023-10-01 RX ADMIN — FUROSEMIDE 40 MG: 40 TABLET ORAL at 08:41

## 2023-10-01 RX ADMIN — NICOTINE POLACRILEX 2 MG: 2 GUM, CHEWING BUCCAL at 08:45

## 2023-10-01 RX ADMIN — HYDROXYZINE HYDROCHLORIDE 25 MG: 25 TABLET, FILM COATED ORAL at 08:41

## 2023-10-01 RX ADMIN — METOPROLOL SUCCINATE 25 MG: 25 TABLET, EXTENDED RELEASE ORAL at 08:41

## 2023-10-01 RX ADMIN — THIAMINE HCL TAB 100 MG 100 MG: 100 TAB at 08:42

## 2023-10-01 RX ADMIN — ALBUTEROL SULFATE 2 PUFF: 90 AEROSOL, METERED RESPIRATORY (INHALATION) at 08:41

## 2023-10-01 RX ADMIN — HYDROXYZINE HYDROCHLORIDE 25 MG: 25 TABLET, FILM COATED ORAL at 01:38

## 2023-10-01 RX ADMIN — POTASSIUM CHLORIDE 10 MEQ: 750 CAPSULE, EXTENDED RELEASE ORAL at 08:42

## 2023-10-01 RX ADMIN — NICOTINE 1 PATCH: 14 PATCH, EXTENDED RELEASE TRANSDERMAL at 08:42

## 2023-10-01 RX ADMIN — FOLIC ACID 1 MG: 1 TABLET ORAL at 08:42

## 2023-10-01 RX ADMIN — LISINOPRIL 10 MG: 10 TABLET ORAL at 08:42

## 2023-10-01 RX ADMIN — ALUMINUM HYDROXIDE, MAGNESIUM HYDROXIDE, AND SIMETHICONE 30 ML: 200; 200; 20 SUSPENSION ORAL at 10:09

## 2023-10-01 ASSESSMENT — ACTIVITIES OF DAILY LIVING (ADL)
DRESS: INDEPENDENT
ADLS_ACUITY_SCORE: 28
ADLS_ACUITY_SCORE: 28
DRESS: SCRUBS (BEHAVIORAL HEALTH);INDEPENDENT
HYGIENE/GROOMING: INDEPENDENT
HYGIENE/GROOMING: INDEPENDENT
ADLS_ACUITY_SCORE: 28
LAUNDRY: WITH SUPERVISION
ADLS_ACUITY_SCORE: 28
ORAL_HYGIENE: INDEPENDENT
ORAL_HYGIENE: INDEPENDENT
ADLS_ACUITY_SCORE: 28
ADLS_ACUITY_SCORE: 28

## 2023-10-01 NOTE — PLAN OF CARE
"  Problem: Adult Behavioral Health Plan of Care  Goal: Plan of Care Review  Outcome: Met  Goal: Patient-Specific Goal (Individualization)  Description: You can add care plan individualizations to a care plan. Examples of Individualization might be:  \"Parent requests to be called daily at 9am for status\", \"I have a hard time hearing out of my right ear\", or \"Do not touch me to wake me up as it startles me\".  Outcome: Met  Goal: Individualized Daily Interaction Plan (IDIP)  Outcome: Met  Goal: Adheres to Safety Considerations for Self and Others  Outcome: Met  Intervention: Develop and Maintain Individualized Safety Plan  Recent Flowsheet Documentation  Taken 10/1/2023 1600 by Jyoti Blackburn RN  Safety Measures:   safety rounds completed   suicide assessment completed  Goal: Absence of New-Onset Illness or Injury  Outcome: Met  Intervention: Identify and Manage Fall Risk  Recent Flowsheet Documentation  Taken 10/1/2023 1600 by Jyoti Blackburn RN  Safety Measures:   safety rounds completed   suicide assessment completed  Goal: Optimized Coping Skills in Response to Life Stressors  Outcome: Met  Goal: Develops/Participates in Therapeutic [place] to Support Successful Transition  Outcome: Met     Problem: Substance Withdrawal  Goal: Substance Withdrawal  Description: Signs and symptoms of listed problems will be absent or manageable.  Outcome: Met  Goal: Social and Therapeutic (Substance Withdrawal)  Description: Signs and symptoms of listed problems will be absent or manageable.  Outcome: Met     Problem: Suicide Risk  Goal: Absence of Self-Harm  Outcome: Met     Problem: Alcohol Withdrawal  Goal: Alcohol Withdrawal Symptom Control  Outcome: Met  Goal: Optimal Neurologic Function  Outcome: Met  Goal: Readiness for Change Identified  Outcome: Met     Problem: Sleep Disturbance  Goal: Adequate Sleep/Rest  Outcome: Met     Problem: Pain Acute  Goal: Optimal Pain Control and Function  Outcome: Met   Goal Outcome " "Evaluation:    Plan of Care Reviewed With: patient                 Patient alert and oriented times 4. He reported \"I definitely feel way better than I was when I got here\". He reports no concerns, and he notes he is doing well. Patient denied all psych symptoms and has been safe on unit. He is stable and vitals noted at /77 (BP Location: Left arm)   Pulse 83   Temp 98.5  F (36.9  C) (Oral)   Resp 16   Ht 1.651 m (5' 5\")   Wt 54.4 kg (120 lb)   SpO2 99%   BMI 19.97 kg/m      Discharge order placed, writer acknowledged order and completed AVS. Writer reviewed AVS and discharge medications with patient. Patient questions answered and he thank everyone. He stated that he will stick to the plan to go to treatment and stay sober.   Patient belongings reviewed with him including his home medications which were kept on unit. He signed the discharge medications forms and the AVS form. He remains stable and was walked off the unit by PA staff. A red and white cab was called for patient and he was given all of his belongings including his large suitcase and his AVS.   Patient off unit at 1704 stable with a bright affect.     FYI-patient was unable to schedule his follow up pcp appointment due to the clinic being close and he verbalized that he takes the responsibility to do so tomorrow-writer gave him the phone number to take along.   "

## 2023-10-01 NOTE — CARE PLAN
10/01/23 1436   Group Therapy Session   Group Attendance attended group session   Time Session Began 1315   Time Session Ended 1400   Total Time (minutes) 45   Total # Attendees 7   Group Type life skill;psychoeducation   Group Topic Covered coping skills/lifestyle management;relationship;problem-solving   Group Session Detail General Health and Coping Skills: education and group discussion on self-esteem and self-compassion.   Patient Participation Detail Pt participated in a group discussion on self-compassion. Pt appeared engaged in the discussion and contributed responses to the discussion questions when prompted.

## 2023-10-01 NOTE — PLAN OF CARE
Goal Outcome Evaluation:  MSSA score of 6,patient did not require medication for alcohol withdrawal and received maalox for heartburn and hydroxizine for sleep to good effect as he is observed to sleep most of the noc

## 2023-10-01 NOTE — PROGRESS NOTES
Met with patient to discuss what he has decided to do. Patient reports he no longer wants to attend residential treatment stating he has had some things have come up that he needs to take care of however wants to go to outpatient [facility] and will be able to schedule intake on his own and is hoping to be able to start Tuesday October 3rd. Patient signed SHERYL for [facility].

## 2023-10-01 NOTE — DISCHARGE SUMMARY
"    Hospital Course:     Patient was admitted and observed. He detoxed with Valium protocol without complication. He had no further complications or medical issues. On day of discharge he was showing no evidence of psychosis, no suicidal or homicidal thoughts and no medical issues.    Patient plans to pursue treatment in the community.        As per H and P done on admit by Dr. Doss      HPI:     The patient is a 54yo male with a history of alcohol use disorder and depression who was admitted to detoxify. Has also been abusing cocaine. Reports that he feels \"like crap.\" Has had some depression and passive SI but no urges or plans. Says \"I was trying to drink myself to death.\" No history of suicide attempts. Says that he \"barely\" slept. Denies any nausea but wasn't able to eat much. Open to MAT for AUD. Considering CD treatment.      Per ER:  John Paul Velazquez is a 53 year old male alcohol problem.  Patient has history of alcohol abuse.  Reports drinking a pint of liquor a day.  Reports drinking half a pint today and last drink was an hour ago.  States that he is seeking help for alcohol detox.  States he has history of anxiety and depression.  States that he has had intermittent SI however no suicidal ideations at this time.         Past Psychiatric History:     Denies any history of suicide attempts.         Substance Use and History:     Alcohol use disorder. Denies a history of withdrawal seizures or DTs.   Stimulant abuse.   Smokes 1/2 PPD.          Past Medical History:   PAST MEDICAL HISTORY: No past medical history on file.    PAST SURGICAL HISTORY: No past surgical history on file.          Family History:   FAMILY HISTORY: Father and mother with alcohol and cocaine abuse.         Social History:   Please see the full psychosocial profile from the clinical treatment coordinator.   SOCIAL HISTORY:   Social History     Tobacco Use    Smoking status: Not on file    Smokeless tobacco: Not on file   Substance Use " "Topics    Alcohol use: Not on file            Physical ROS:   The patient endorsed fatigue. The remainder of 10-point review of systems was negative except as noted in HPI.         PTA Medications:     Medications Prior to Admission   Medication Sig Dispense Refill Last Dose    budesonide-formoterol (SYMBICORT) 160-4.5 MCG/ACT Inhaler Inhale 2 puffs into the lungs two times daily Last filled April 2023 for 30 days supply       furosemide (LASIX) 40 MG tablet Take 40 mg by mouth 2 times daily Last filled August 2023 for 30 days supply   Past Week    lisinopril (ZESTRIL) 10 MG tablet Take 10 mg by mouth daily Last filled April 2023 9/27/2023    albuterol (PROAIR HFA/PROVENTIL HFA/VENTOLIN HFA) 108 (90 Base) MCG/ACT inhaler Inhale 2 puffs into the lungs every 6 hours as needed for shortness of breath, wheezing or cough       metoprolol succinate ER (TOPROL XL) 25 MG 24 hr tablet Take 25 mg by mouth daily Last filled April 2023 for 30 days supply       potassium chloride ER (K-TAB/KLOR-CON) 10 MEQ CR tablet Take 10 mEq by mouth daily               Allergies:   No Known Allergies       Labs:     Recent Results (from the past 48 hour(s))   Basic metabolic panel    Collection Time: 09/30/23  8:04 AM   Result Value Ref Range    Sodium 142 135 - 145 mmol/L    Potassium 4.6 3.4 - 5.3 mmol/L    Chloride 104 98 - 107 mmol/L    Carbon Dioxide (CO2) 30 (H) 22 - 29 mmol/L    Anion Gap 8 7 - 15 mmol/L    Urea Nitrogen 16.8 6.0 - 20.0 mg/dL    Creatinine 1.09 0.67 - 1.17 mg/dL    GFR Estimate 82 >60 mL/min/1.73m2    Calcium 8.7 8.6 - 10.0 mg/dL    Glucose 86 70 - 99 mg/dL          Physical and Psychiatric Examination:     BP (!) 128/98 (BP Location: Left arm, Patient Position: Sitting, Cuff Size: Adult Regular)   Pulse 103   Temp 97.1  F (36.2  C) (Temporal)   Resp 16   Ht 1.651 m (5' 5\")   Wt 54.4 kg (120 lb)   SpO2 99%   BMI 19.97 kg/m    Weight is 120 lbs 0 oz  Body mass index is 19.97 kg/m .    Physical Exam:  I have " reviewed the physical exam as documented by the medical team and agree with findings and assessment and have no additional findings to add at this time.    Mental Status Exam:  Appearance: awake, alert and adequately groomed  Attitude:  cooperative  Eye Contact:  fair  Mood:  depressed  Affect:  mood congruent  Speech:  clear, coherent  Language: fluent and intact in English  Psychomotor, Gait, Musculoskeletal:  no evidence of tardive dyskinesia, dystonia, or tics  Thought Process:  goal oriented  Associations:  no loose associations  Thought Content:  no evidence of suicidal ideation or homicidal ideation and no evidence of psychotic thought  Insight:  fair  Judgement:  fair  Oriented to:  time, person, and place  Attention Span and Concentration:  intact  Recent and Remote Memory:  fair  Fund of Knowledge:  appropriate         Admission Diagnoses:     Alcohol use disorder, severe  Alcohol withdrawal with unspecified complication   MDD versus alcohol-induced depression  Nicotine dependence with current use                       Assessment & Plan:     Discharge to home today     fluticasone-vilanterol  1 puff Inhalation Daily    folic acid  1 mg Oral Daily    furosemide  40 mg Oral BID    lisinopril  10 mg Oral Daily    metoprolol succinate ER  25 mg Oral Daily    mirtazapine  15 mg Oral At Bedtime    multivitamin w/minerals  1 tablet Oral Daily    nicotine  1 patch Transdermal Daily    nicotine   Transdermal Q8H    potassium chloride ER  10 mEq Oral Daily    thiamine  100 mg Oral Daily     More than 30 minutes spent on this visit including patient interview, coordination of care with staff, reviewing medical record, psychoeducation, providing supportive therapy regarding coping with chronic mental illness, entering orders and preparing documentation for the visit    Derrick Nichole MD

## 2023-10-01 NOTE — PLAN OF CARE
"Goal Outcome Evaluation:    Plan of Care Reviewed With: patient        Pt visible in the unit, socialized with peers, watched viking game and paced the hallway. Pt endorsed anxiety this morning and received prn hydroxyzine with improvement. Pt denies all other mental health psych symptoms and contracted for safety. Pt scored 5 and 3 for MSSA and pt is out of detox. Pt planned discharging home today. Pt want antabuse before discharge and Dr. Nichole said pt out pt psychiatry should order it and pt does not have out patient psychiatrist. Dr. Ybarra paged for second opinion and Dr. Ybarra planned seeing pt at 1400 for further evaluation. This information relayed to pt and he was receptive. Pt received Protonix for heart burn with improvement. Adequate food and fluid intake, voiding freely and no BM issue per pt. Pt diastolic BP elevated and pt denies headache or dizziness and received scheduled BP medications (metoprolol and losartan). Pt denies short of breathing with normal breathing pattern. BP (!) 123/90 (BP Location: Left arm)   Pulse 88   Temp 97.8  F (36.6  C) (Tympanic)   Resp 16   Ht 1.651 m (5' 5\")   Wt 54.4 kg (120 lb)   SpO2 99%   BMI 19.97 kg/m               "

## 2025-01-15 ENCOUNTER — HOSPITAL ENCOUNTER (INPATIENT)
Facility: CLINIC | Age: 55
End: 2025-01-15
Attending: EMERGENCY MEDICINE | Admitting: PSYCHIATRY & NEUROLOGY
Payer: MEDICAID

## 2025-01-15 ENCOUNTER — TELEPHONE (OUTPATIENT)
Dept: BEHAVIORAL HEALTH | Facility: CLINIC | Age: 55
End: 2025-01-15

## 2025-01-15 ENCOUNTER — HOSPITAL ENCOUNTER (EMERGENCY)
Facility: CLINIC | Age: 55
Discharge: HOME | End: 2025-01-15
Payer: MEDICAID

## 2025-01-15 DIAGNOSIS — J45.41 MODERATE PERSISTENT ASTHMA WITH EXACERBATION: Primary | ICD-10-CM

## 2025-01-15 DIAGNOSIS — R45.851 SUICIDAL IDEATION: ICD-10-CM

## 2025-01-15 DIAGNOSIS — F10.10 ALCOHOL ABUSE: ICD-10-CM

## 2025-01-15 DIAGNOSIS — R05.9 COUGH, UNSPECIFIED TYPE: ICD-10-CM

## 2025-01-15 DIAGNOSIS — Z92.89 S/P ALCOHOL DETOXIFICATION: ICD-10-CM

## 2025-01-15 DIAGNOSIS — R06.2 WHEEZING: ICD-10-CM

## 2025-01-15 DIAGNOSIS — F10.239 ALCOHOL DEPENDENCE WITH WITHDRAWAL WITH COMPLICATION (H): ICD-10-CM

## 2025-01-15 PROBLEM — F32.A DEPRESSION, UNSPECIFIED DEPRESSION TYPE: Status: ACTIVE | Noted: 2025-01-15

## 2025-01-15 LAB
ALBUMIN SERPL BCG-MCNC: 4.5 G/DL (ref 3.5–5.2)
ALCOHOL BREATH TEST: 0.18 (ref 0–0.01)
ALP SERPL-CCNC: 65 U/L (ref 40–150)
ALT SERPL W P-5'-P-CCNC: 79 U/L (ref 0–70)
AMPHETAMINES UR QL SCN: ABNORMAL
ANION GAP SERPL CALCULATED.3IONS-SCNC: 14 MMOL/L (ref 7–15)
AST SERPL W P-5'-P-CCNC: 88 U/L (ref 0–45)
BARBITURATES UR QL SCN: ABNORMAL
BASOPHILS # BLD AUTO: 0.1 10E3/UL (ref 0–0.2)
BASOPHILS NFR BLD AUTO: 1 %
BENZODIAZ UR QL SCN: ABNORMAL
BILIRUB SERPL-MCNC: 0.4 MG/DL
BUN SERPL-MCNC: 12.4 MG/DL (ref 6–20)
BZE UR QL SCN: ABNORMAL
CALCIUM SERPL-MCNC: 8 MG/DL (ref 8.8–10.4)
CANNABINOIDS UR QL SCN: ABNORMAL
CHLORIDE SERPL-SCNC: 97 MMOL/L (ref 98–107)
CREAT SERPL-MCNC: 0.99 MG/DL (ref 0.67–1.17)
EGFRCR SERPLBLD CKD-EPI 2021: >90 ML/MIN/1.73M2
EOSINOPHIL # BLD AUTO: 0 10E3/UL (ref 0–0.7)
EOSINOPHIL NFR BLD AUTO: 0 %
ERYTHROCYTE [DISTWIDTH] IN BLOOD BY AUTOMATED COUNT: 15.9 % (ref 10–15)
ETHANOL SERPL-MCNC: 0.23 G/DL
FENTANYL UR QL: ABNORMAL
FLUAV RNA SPEC QL NAA+PROBE: NEGATIVE
FLUBV RNA RESP QL NAA+PROBE: NEGATIVE
GLUCOSE SERPL-MCNC: 102 MG/DL (ref 70–99)
HCO3 SERPL-SCNC: 28 MMOL/L (ref 22–29)
HCT VFR BLD AUTO: 42.8 % (ref 40–53)
HGB BLD-MCNC: 15.1 G/DL (ref 13.3–17.7)
IMM GRANULOCYTES # BLD: 0 10E3/UL
IMM GRANULOCYTES NFR BLD: 0 %
LIPASE SERPL-CCNC: 80 U/L (ref 13–60)
LYMPHOCYTES # BLD AUTO: 1.4 10E3/UL (ref 0.8–5.3)
LYMPHOCYTES NFR BLD AUTO: 17 %
MAGNESIUM SERPL-MCNC: 2.1 MG/DL (ref 1.7–2.3)
MCH RBC QN AUTO: 30.4 PG (ref 26.5–33)
MCHC RBC AUTO-ENTMCNC: 35.3 G/DL (ref 31.5–36.5)
MCV RBC AUTO: 86 FL (ref 78–100)
MONOCYTES # BLD AUTO: 0.6 10E3/UL (ref 0–1.3)
MONOCYTES NFR BLD AUTO: 7 %
NEUTROPHILS # BLD AUTO: 5.9 10E3/UL (ref 1.6–8.3)
NEUTROPHILS NFR BLD AUTO: 74 %
NRBC # BLD AUTO: 0 10E3/UL
NRBC BLD AUTO-RTO: 0 /100
OPIATES UR QL SCN: ABNORMAL
PCP QUAL URINE (ROCHE): ABNORMAL
PLATELET # BLD AUTO: 336 10E3/UL (ref 150–450)
POTASSIUM SERPL-SCNC: 2.9 MMOL/L (ref 3.4–5.3)
PROT SERPL-MCNC: 6.7 G/DL (ref 6.4–8.3)
RBC # BLD AUTO: 4.97 10E6/UL (ref 4.4–5.9)
RSV RNA SPEC NAA+PROBE: NEGATIVE
SARS-COV-2 RNA RESP QL NAA+PROBE: NEGATIVE
SODIUM SERPL-SCNC: 139 MMOL/L (ref 135–145)
WBC # BLD AUTO: 8 10E3/UL (ref 4–11)

## 2025-01-15 PROCEDURE — 99285 EMERGENCY DEPT VISIT HI MDM: CPT | Performed by: EMERGENCY MEDICINE

## 2025-01-15 PROCEDURE — HZ2ZZZZ DETOXIFICATION SERVICES FOR SUBSTANCE ABUSE TREATMENT: ICD-10-PCS | Performed by: PSYCHIATRY & NEUROLOGY

## 2025-01-15 PROCEDURE — 82977 ASSAY OF GGT: CPT | Performed by: PSYCHIATRY & NEUROLOGY

## 2025-01-15 PROCEDURE — 83036 HEMOGLOBIN GLYCOSYLATED A1C: CPT | Performed by: PSYCHIATRY & NEUROLOGY

## 2025-01-15 PROCEDURE — 83735 ASSAY OF MAGNESIUM: CPT | Performed by: EMERGENCY MEDICINE

## 2025-01-15 PROCEDURE — 80307 DRUG TEST PRSMV CHEM ANLYZR: CPT | Performed by: EMERGENCY MEDICINE

## 2025-01-15 PROCEDURE — 80053 COMPREHEN METABOLIC PANEL: CPT | Performed by: EMERGENCY MEDICINE

## 2025-01-15 PROCEDURE — 83690 ASSAY OF LIPASE: CPT | Performed by: EMERGENCY MEDICINE

## 2025-01-15 PROCEDURE — 36415 COLL VENOUS BLD VENIPUNCTURE: CPT | Performed by: EMERGENCY MEDICINE

## 2025-01-15 PROCEDURE — 84443 ASSAY THYROID STIM HORMONE: CPT | Performed by: PSYCHIATRY & NEUROLOGY

## 2025-01-15 PROCEDURE — 250N000012 HC RX MED GY IP 250 OP 636 PS 637: Performed by: EMERGENCY MEDICINE

## 2025-01-15 PROCEDURE — 87637 SARSCOV2&INF A&B&RSV AMP PRB: CPT | Performed by: EMERGENCY MEDICINE

## 2025-01-15 PROCEDURE — 82077 ASSAY SPEC XCP UR&BREATH IA: CPT | Performed by: EMERGENCY MEDICINE

## 2025-01-15 PROCEDURE — 250N000013 HC RX MED GY IP 250 OP 250 PS 637: Performed by: EMERGENCY MEDICINE

## 2025-01-15 PROCEDURE — 85025 COMPLETE CBC W/AUTO DIFF WBC: CPT | Performed by: EMERGENCY MEDICINE

## 2025-01-15 PROCEDURE — 82075 ASSAY OF BREATH ETHANOL: CPT | Performed by: EMERGENCY MEDICINE

## 2025-01-15 RX ORDER — FLUMAZENIL 0.1 MG/ML
0.2 INJECTION, SOLUTION INTRAVENOUS
Status: DISCONTINUED | OUTPATIENT
Start: 2025-01-15 | End: 2025-01-15

## 2025-01-15 RX ORDER — LORAZEPAM 1 MG/1
1-2 TABLET ORAL EVERY 30 MIN PRN
Status: DISCONTINUED | OUTPATIENT
Start: 2025-01-15 | End: 2025-01-16

## 2025-01-15 RX ORDER — LORAZEPAM 2 MG/ML
1-2 INJECTION INTRAMUSCULAR EVERY 30 MIN PRN
Status: DISCONTINUED | OUTPATIENT
Start: 2025-01-15 | End: 2025-01-16

## 2025-01-15 RX ORDER — POTASSIUM CHLORIDE 750 MG/1
40 TABLET, EXTENDED RELEASE ORAL ONCE
Status: DISCONTINUED | OUTPATIENT
Start: 2025-01-15 | End: 2025-01-15

## 2025-01-15 RX ORDER — POTASSIUM CHLORIDE 1500 MG/1
60 TABLET, EXTENDED RELEASE ORAL ONCE
Status: COMPLETED | OUTPATIENT
Start: 2025-01-15 | End: 2025-01-15

## 2025-01-15 RX ORDER — FLUMAZENIL 0.1 MG/ML
0.2 INJECTION, SOLUTION INTRAVENOUS
Status: DISCONTINUED | OUTPATIENT
Start: 2025-01-15 | End: 2025-01-16

## 2025-01-15 RX ORDER — ALBUTEROL SULFATE 90 UG/1
1-2 INHALANT RESPIRATORY (INHALATION) EVERY 4 HOURS PRN
Status: DISCONTINUED | OUTPATIENT
Start: 2025-01-15 | End: 2025-01-16

## 2025-01-15 RX ORDER — FOLIC ACID 1 MG/1
1 TABLET ORAL DAILY
Status: DISCONTINUED | OUTPATIENT
Start: 2025-01-15 | End: 2025-01-16

## 2025-01-15 RX ORDER — PREDNISONE 20 MG/1
40 TABLET ORAL DAILY
Status: DISCONTINUED | OUTPATIENT
Start: 2025-01-15 | End: 2025-01-17 | Stop reason: HOSPADM

## 2025-01-15 RX ORDER — MULTIPLE VITAMINS W/ MINERALS TAB 9MG-400MCG
1 TAB ORAL DAILY
Status: DISCONTINUED | OUTPATIENT
Start: 2025-01-15 | End: 2025-01-17 | Stop reason: HOSPADM

## 2025-01-15 RX ADMIN — Medication 1 TABLET: at 19:37

## 2025-01-15 RX ADMIN — POTASSIUM CHLORIDE 60 MEQ: 1500 TABLET, EXTENDED RELEASE ORAL at 20:34

## 2025-01-15 RX ADMIN — FOLIC ACID 1 MG: 1 TABLET ORAL at 19:37

## 2025-01-15 RX ADMIN — ALBUTEROL SULFATE 2 PUFF: 90 AEROSOL, METERED RESPIRATORY (INHALATION) at 19:37

## 2025-01-15 RX ADMIN — THIAMINE HCL TAB 100 MG 100 MG: 100 TAB at 19:37

## 2025-01-15 RX ADMIN — LORAZEPAM 1 MG: 1 TABLET ORAL at 21:43

## 2025-01-15 RX ADMIN — PREDNISONE 40 MG: 20 TABLET ORAL at 19:37

## 2025-01-15 RX ADMIN — LORAZEPAM 1 MG: 1 TABLET ORAL at 22:45

## 2025-01-15 ASSESSMENT — LIFESTYLE VARIABLES
AGITATION: NORMAL ACTIVITY
PAROXYSMAL SWEATS: NO SWEAT VISIBLE
AGITATION: SOMEWHAT MORE THAN NORMAL ACTIVITY
TOTAL SCORE: 11
TREMOR: NOT VISIBLE, BUT CAN BE FELT FINGERTIP TO FINGERTIP
ORIENTATION AND CLOUDING OF SENSORIUM: ORIENTED AND CAN DO SERIAL ADDITIONS
NAUSEA AND VOMITING: MILD NAUSEA WITH NO VOMITING
ORIENTATION AND CLOUDING OF SENSORIUM: ORIENTED AND CAN DO SERIAL ADDITIONS
NAUSEA AND VOMITING: MILD NAUSEA WITH NO VOMITING
ANXIETY: NO ANXIETY, AT EASE
NAUSEA AND VOMITING: MILD NAUSEA WITH NO VOMITING
TOTAL SCORE: 6
HEADACHE, FULLNESS IN HEAD: MODERATE
ANXIETY: NO ANXIETY, AT EASE
TREMOR: 2
VISUAL DISTURBANCES: MILD SENSITIVITY
PAROXYSMAL SWEATS: BARELY PERCEPTIBLE SWEATING, PALMS MOIST
AUDITORY DISTURBANCES: NOT PRESENT
HEADACHE, FULLNESS IN HEAD: MILD
AUDITORY DISTURBANCES: NOT PRESENT
VISUAL DISTURBANCES: MILD SENSITIVITY
AGITATION: SOMEWHAT MORE THAN NORMAL ACTIVITY
HEADACHE, FULLNESS IN HEAD: MODERATE
TOTAL SCORE: 9
ORIENTATION AND CLOUDING OF SENSORIUM: ORIENTED AND CAN DO SERIAL ADDITIONS
AUDITORY DISTURBANCES: VERY MILD HARSHNESS OR ABILITY TO FRIGHTEN
VISUAL DISTURBANCES: MILD SENSITIVITY
PAROXYSMAL SWEATS: BARELY PERCEPTIBLE SWEATING, PALMS MOIST
TREMOR: NOT VISIBLE, BUT CAN BE FELT FINGERTIP TO FINGERTIP
ANXIETY: NO ANXIETY, AT EASE

## 2025-01-15 ASSESSMENT — COLUMBIA-SUICIDE SEVERITY RATING SCALE - C-SSRS
6. HAVE YOU EVER DONE ANYTHING, STARTED TO DO ANYTHING, OR PREPARED TO DO ANYTHING TO END YOUR LIFE?: NO
4. HAVE YOU HAD THESE THOUGHTS AND HAD SOME INTENTION OF ACTING ON THEM?: NO
1. IN THE PAST MONTH, HAVE YOU WISHED YOU WERE DEAD OR WISHED YOU COULD GO TO SLEEP AND NOT WAKE UP?: YES
5. HAVE YOU STARTED TO WORK OUT OR WORKED OUT THE DETAILS OF HOW TO KILL YOURSELF? DO YOU INTEND TO CARRY OUT THIS PLAN?: NO
3. HAVE YOU BEEN THINKING ABOUT HOW YOU MIGHT KILL YOURSELF?: NO
2. HAVE YOU ACTUALLY HAD ANY THOUGHTS OF KILLING YOURSELF IN THE PAST MONTH?: YES

## 2025-01-15 ASSESSMENT — ACTIVITIES OF DAILY LIVING (ADL)
ADLS_ACUITY_SCORE: 41

## 2025-01-16 VITALS
BODY MASS INDEX: 21.66 KG/M2 | HEIGHT: 65 IN | HEART RATE: 93 BPM | WEIGHT: 130 LBS | OXYGEN SATURATION: 97 % | TEMPERATURE: 97.1 F | RESPIRATION RATE: 16 BRPM | SYSTOLIC BLOOD PRESSURE: 134 MMHG | DIASTOLIC BLOOD PRESSURE: 87 MMHG

## 2025-01-16 PROBLEM — F10.239 ALCOHOL DEPENDENCE WITH WITHDRAWAL WITH COMPLICATION (H): Status: ACTIVE | Noted: 2025-01-16

## 2025-01-16 PROBLEM — F10.90 ALCOHOL USE DISORDER: Status: ACTIVE | Noted: 2025-01-16

## 2025-01-16 LAB
ANION GAP SERPL CALCULATED.3IONS-SCNC: 10 MMOL/L (ref 7–15)
BUN SERPL-MCNC: 15.8 MG/DL (ref 6–20)
CALCIUM SERPL-MCNC: 9.3 MG/DL (ref 8.8–10.4)
CHLORIDE SERPL-SCNC: 103 MMOL/L (ref 98–107)
CREAT SERPL-MCNC: 0.81 MG/DL (ref 0.67–1.17)
EGFRCR SERPLBLD CKD-EPI 2021: >90 ML/MIN/1.73M2
EST. AVERAGE GLUCOSE BLD GHB EST-MCNC: 137 MG/DL
GGT SERPL-CCNC: 249 U/L (ref 8–61)
GLUCOSE SERPL-MCNC: 130 MG/DL (ref 70–99)
HBA1C MFR BLD: 6.4 %
HCO3 SERPL-SCNC: 30 MMOL/L (ref 22–29)
HOLD SPECIMEN: NORMAL
POTASSIUM SERPL-SCNC: 4 MMOL/L (ref 3.4–5.3)
SODIUM SERPL-SCNC: 143 MMOL/L (ref 135–145)
TSH SERPL DL<=0.005 MIU/L-ACNC: 1.29 UIU/ML (ref 0.3–4.2)

## 2025-01-16 PROCEDURE — 128N000004 HC R&B CD ADULT

## 2025-01-16 PROCEDURE — 99223 1ST HOSP IP/OBS HIGH 75: CPT | Performed by: PSYCHIATRY & NEUROLOGY

## 2025-01-16 PROCEDURE — 99254 IP/OBS CNSLTJ NEW/EST MOD 60: CPT | Performed by: STUDENT IN AN ORGANIZED HEALTH CARE EDUCATION/TRAINING PROGRAM

## 2025-01-16 PROCEDURE — 250N000013 HC RX MED GY IP 250 OP 250 PS 637: Performed by: PSYCHIATRY & NEUROLOGY

## 2025-01-16 PROCEDURE — 80048 BASIC METABOLIC PNL TOTAL CA: CPT | Performed by: PSYCHIATRY & NEUROLOGY

## 2025-01-16 PROCEDURE — 82310 ASSAY OF CALCIUM: CPT | Performed by: PSYCHIATRY & NEUROLOGY

## 2025-01-16 PROCEDURE — 250N000013 HC RX MED GY IP 250 OP 250 PS 637: Performed by: EMERGENCY MEDICINE

## 2025-01-16 PROCEDURE — 250N000013 HC RX MED GY IP 250 OP 250 PS 637: Performed by: STUDENT IN AN ORGANIZED HEALTH CARE EDUCATION/TRAINING PROGRAM

## 2025-01-16 PROCEDURE — 250N000011 HC RX IP 250 OP 636: Performed by: PSYCHIATRY & NEUROLOGY

## 2025-01-16 PROCEDURE — 82565 ASSAY OF CREATININE: CPT | Performed by: PSYCHIATRY & NEUROLOGY

## 2025-01-16 PROCEDURE — 36415 COLL VENOUS BLD VENIPUNCTURE: CPT | Performed by: PSYCHIATRY & NEUROLOGY

## 2025-01-16 PROCEDURE — 250N000012 HC RX MED GY IP 250 OP 636 PS 637: Performed by: PSYCHIATRY & NEUROLOGY

## 2025-01-16 RX ORDER — ACETAMINOPHEN 325 MG/1
650 TABLET ORAL EVERY 4 HOURS PRN
Status: DISCONTINUED | OUTPATIENT
Start: 2025-01-16 | End: 2025-01-17 | Stop reason: HOSPADM

## 2025-01-16 RX ORDER — LOPERAMIDE HYDROCHLORIDE 2 MG/1
2 CAPSULE ORAL 4 TIMES DAILY PRN
Status: DISCONTINUED | OUTPATIENT
Start: 2025-01-16 | End: 2025-01-17 | Stop reason: HOSPADM

## 2025-01-16 RX ORDER — FLUTICASONE FUROATE AND VILANTEROL 200; 25 UG/1; UG/1
1 POWDER RESPIRATORY (INHALATION) DAILY
Status: DISCONTINUED | OUTPATIENT
Start: 2025-01-16 | End: 2025-01-17 | Stop reason: HOSPADM

## 2025-01-16 RX ORDER — MIRTAZAPINE 15 MG/1
15 TABLET, FILM COATED ORAL AT BEDTIME
Status: DISCONTINUED | OUTPATIENT
Start: 2025-01-16 | End: 2025-01-17 | Stop reason: HOSPADM

## 2025-01-16 RX ORDER — FOLIC ACID 1 MG/1
1 TABLET ORAL DAILY
Status: DISCONTINUED | OUTPATIENT
Start: 2025-01-16 | End: 2025-01-17 | Stop reason: HOSPADM

## 2025-01-16 RX ORDER — ALBUTEROL SULFATE 90 UG/1
2 INHALANT RESPIRATORY (INHALATION) 4 TIMES DAILY
Status: DISCONTINUED | OUTPATIENT
Start: 2025-01-16 | End: 2025-01-17 | Stop reason: HOSPADM

## 2025-01-16 RX ORDER — DIAZEPAM 5 MG/1
5-20 TABLET ORAL EVERY 30 MIN PRN
Status: DISCONTINUED | OUTPATIENT
Start: 2025-01-16 | End: 2025-01-17 | Stop reason: HOSPADM

## 2025-01-16 RX ORDER — MAGNESIUM HYDROXIDE/ALUMINUM HYDROXICE/SIMETHICONE 120; 1200; 1200 MG/30ML; MG/30ML; MG/30ML
30 SUSPENSION ORAL EVERY 4 HOURS PRN
Status: DISCONTINUED | OUTPATIENT
Start: 2025-01-16 | End: 2025-01-17 | Stop reason: HOSPADM

## 2025-01-16 RX ORDER — ONDANSETRON 4 MG/1
4 TABLET, ORALLY DISINTEGRATING ORAL EVERY 6 HOURS PRN
Status: DISCONTINUED | OUTPATIENT
Start: 2025-01-16 | End: 2025-01-17 | Stop reason: HOSPADM

## 2025-01-16 RX ORDER — HYDROXYZINE HYDROCHLORIDE 25 MG/1
25 TABLET, FILM COATED ORAL EVERY 4 HOURS PRN
Status: DISCONTINUED | OUTPATIENT
Start: 2025-01-16 | End: 2025-01-17 | Stop reason: HOSPADM

## 2025-01-16 RX ORDER — NICOTINE 21 MG/24HR
1 PATCH, TRANSDERMAL 24 HOURS TRANSDERMAL DAILY
Status: DISCONTINUED | OUTPATIENT
Start: 2025-01-16 | End: 2025-01-17 | Stop reason: HOSPADM

## 2025-01-16 RX ORDER — TRAZODONE HYDROCHLORIDE 50 MG/1
50 TABLET, FILM COATED ORAL
Status: DISCONTINUED | OUTPATIENT
Start: 2025-01-16 | End: 2025-01-17 | Stop reason: HOSPADM

## 2025-01-16 RX ORDER — HYDRALAZINE HYDROCHLORIDE 10 MG/1
10 TABLET, FILM COATED ORAL 4 TIMES DAILY PRN
Status: DISCONTINUED | OUTPATIENT
Start: 2025-01-16 | End: 2025-01-17 | Stop reason: HOSPADM

## 2025-01-16 RX ORDER — ALBUTEROL SULFATE 90 UG/1
2 INHALANT RESPIRATORY (INHALATION) EVERY 4 HOURS PRN
Status: DISCONTINUED | OUTPATIENT
Start: 2025-01-16 | End: 2025-01-17 | Stop reason: HOSPADM

## 2025-01-16 RX ORDER — AMOXICILLIN 250 MG
1 CAPSULE ORAL 2 TIMES DAILY PRN
Status: DISCONTINUED | OUTPATIENT
Start: 2025-01-16 | End: 2025-01-17 | Stop reason: HOSPADM

## 2025-01-16 RX ORDER — METOPROLOL SUCCINATE 25 MG/1
25 TABLET, EXTENDED RELEASE ORAL DAILY
Status: DISCONTINUED | OUTPATIENT
Start: 2025-01-16 | End: 2025-01-16

## 2025-01-16 RX ORDER — LISINOPRIL 10 MG/1
10 TABLET ORAL DAILY
Status: DISCONTINUED | OUTPATIENT
Start: 2025-01-16 | End: 2025-01-16

## 2025-01-16 RX ADMIN — DIAZEPAM 10 MG: 5 TABLET ORAL at 16:31

## 2025-01-16 RX ADMIN — MIRTAZAPINE 15 MG: 15 TABLET, FILM COATED ORAL at 21:20

## 2025-01-16 RX ADMIN — DIAZEPAM 10 MG: 5 TABLET ORAL at 08:39

## 2025-01-16 RX ADMIN — NICOTINE POLACRILEX 2 MG: 2 GUM, CHEWING BUCCAL at 20:26

## 2025-01-16 RX ADMIN — ALBUTEROL SULFATE 2 PUFF: 90 AEROSOL, METERED RESPIRATORY (INHALATION) at 20:25

## 2025-01-16 RX ADMIN — ALBUTEROL SULFATE 2 PUFF: 90 AEROSOL, METERED RESPIRATORY (INHALATION) at 16:30

## 2025-01-16 RX ADMIN — FOLIC ACID 1 MG: 1 TABLET ORAL at 08:38

## 2025-01-16 RX ADMIN — ALBUTEROL SULFATE 2 PUFF: 90 AEROSOL, METERED RESPIRATORY (INHALATION) at 11:37

## 2025-01-16 RX ADMIN — THIAMINE HCL TAB 100 MG 100 MG: 100 TAB at 08:39

## 2025-01-16 RX ADMIN — DIAZEPAM 10 MG: 5 TABLET ORAL at 14:25

## 2025-01-16 RX ADMIN — PREDNISONE 40 MG: 20 TABLET ORAL at 08:38

## 2025-01-16 RX ADMIN — METOPROLOL SUCCINATE 25 MG: 25 TABLET, FILM COATED, EXTENDED RELEASE ORAL at 10:28

## 2025-01-16 RX ADMIN — DIAZEPAM 10 MG: 5 TABLET ORAL at 11:34

## 2025-01-16 RX ADMIN — LORAZEPAM 1 MG: 1 TABLET ORAL at 00:38

## 2025-01-16 RX ADMIN — HYDRALAZINE HYDROCHLORIDE 10 MG: 10 TABLET ORAL at 09:21

## 2025-01-16 RX ADMIN — NICOTINE POLACRILEX 2 MG: 2 GUM, CHEWING BUCCAL at 15:03

## 2025-01-16 RX ADMIN — ONDANSETRON 4 MG: 4 TABLET, ORALLY DISINTEGRATING ORAL at 14:25

## 2025-01-16 RX ADMIN — HYDROXYZINE HYDROCHLORIDE 25 MG: 25 TABLET, FILM COATED ORAL at 16:31

## 2025-01-16 RX ADMIN — Medication 1 TABLET: at 08:38

## 2025-01-16 RX ADMIN — DIAZEPAM 10 MG: 5 TABLET ORAL at 03:28

## 2025-01-16 RX ADMIN — NICOTINE POLACRILEX 2 MG: 2 GUM, CHEWING BUCCAL at 18:43

## 2025-01-16 RX ADMIN — NICOTINE 1 PATCH: 14 PATCH, EXTENDED RELEASE TRANSDERMAL at 10:28

## 2025-01-16 RX ADMIN — DIAZEPAM 10 MG: 5 TABLET ORAL at 20:26

## 2025-01-16 RX ADMIN — NICOTINE POLACRILEX 2 MG: 2 GUM, CHEWING BUCCAL at 21:20

## 2025-01-16 RX ADMIN — NICOTINE POLACRILEX 2 MG: 2 GUM, CHEWING BUCCAL at 16:31

## 2025-01-16 RX ADMIN — TRAZODONE HYDROCHLORIDE 50 MG: 50 TABLET ORAL at 21:20

## 2025-01-16 RX ADMIN — ALBUTEROL SULFATE 2 PUFF: 90 AEROSOL, METERED RESPIRATORY (INHALATION) at 08:38

## 2025-01-16 RX ADMIN — FLUTICASONE FUROATE AND VILANTEROL TRIFENATATE 1 PUFF: 200; 25 POWDER RESPIRATORY (INHALATION) at 11:39

## 2025-01-16 ASSESSMENT — ACTIVITIES OF DAILY LIVING (ADL)
ADLS_ACUITY_SCORE: 15
ADLS_ACUITY_SCORE: 41
ADLS_ACUITY_SCORE: 15
ADLS_ACUITY_SCORE: 41
ADLS_ACUITY_SCORE: 15
LAUNDRY: WITH SUPERVISION
ORAL_HYGIENE: INDEPENDENT
ADLS_ACUITY_SCORE: 15
ADLS_ACUITY_SCORE: 41
ADLS_ACUITY_SCORE: 41
ADLS_ACUITY_SCORE: 15
HYGIENE/GROOMING: INDEPENDENT
DRESS: INDEPENDENT
ADLS_ACUITY_SCORE: 15

## 2025-01-16 ASSESSMENT — LIFESTYLE VARIABLES
VISUAL DISTURBANCES: VERY MILD SENSITIVITY
PAROXYSMAL SWEATS: BARELY PERCEPTIBLE SWEATING, PALMS MOIST
HEADACHE, FULLNESS IN HEAD: MODERATE
AGITATION: SOMEWHAT MORE THAN NORMAL ACTIVITY
AUDITORY DISTURBANCES: NOT PRESENT
SKIP TO QUESTIONS 9-10: 0
TOTAL SCORE: 9
ORIENTATION AND CLOUDING OF SENSORIUM: ORIENTED AND CAN DO SERIAL ADDITIONS
ANXIETY: NO ANXIETY, AT EASE
TREMOR: 2
NAUSEA AND VOMITING: MILD NAUSEA WITH NO VOMITING

## 2025-01-16 ASSESSMENT — PATIENT HEALTH QUESTIONNAIRE - PHQ9: SUM OF ALL RESPONSES TO PHQ9 QUESTIONS 1 & 2: 6

## 2025-01-16 NOTE — DISCHARGE INSTRUCTIONS
Behavioral Discharge Planning and Instructions  THANK YOU FOR CHOOSING [facility]  3A  [phone]    Summary: You were admitted to Station 3A on *** for detoxification from ***.  A medical exam was performed that included lab work. You have met with a [facility] Counselor and opted to ***.  Please take care and make your recovery a daily priority, John Paul!  It was a pleasure working with you and the entire treatment team here wishes you the very best in your recovery!     Recommendation:  ***    Main Diagnoses:    {Substance Related Use Disorders:707010}    Major Treatments, Procedures and Findings:  You were treated for *** detoxification using ***. As an outpatient you will be prescribed ***, please take this medication as prescribed until it is gone. You have met with a [facility] counselor to develop a treatment plan for discharge. You had labs drawn and those results were reviewed with you. Please take a copy of your lab work with you to your next primary care provider appointment.    Symptoms to Report:  If you experience more anxiety, confusion, sleeplessness, deep sadness or thoughts of suicide, notify your treatment team or notify your primary care provider. IF ANY OF THE SYMPTOMS YOU ARE EXPERIENCING ARE A MEDICAL EMERGENCY CALL 911 IMMEDIATELY.     Lifestyle Adjustment: Adjust your lifestyle to get enough sleep, relaxation, exercise and good nutrition. Continue to develop healthy coping skills to decrease stress and promote a sober living environment. Do not use mood altering substances including alcohol, illegal drugs or addictive medications other than what is currently prescribed.     Disposition: ***    Facts about COVID19 at www.cdc.gov/COVID19 and www.[place].gov/covid19    Keeping hands clean is one of the most important steps we can take to avoid getting sick and spreading germs to others.  Please wash your hands frequently and lather with soap for at least 20 seconds!    Follow-up Appointment:    Appointment Date/Time: ***    Psychiatrist/Primary Care Giver: ***    Address: ***    Phone Number: ***      Recovery apps for your phone for educational purposes and to locate in person and zoom recovery meetings  Everything AA -  james is a great resource  12 Step Toolkit - educational purpose learning about the 12 steps to recovery  [place] [employer] - meeting james  AA  - meeting james  Meeting guide - meeting james  Quick NA meeting - meeting james  [place]- has various apps    Resources:  AA/NA meetings have returned to in-person or a hybrid combination of zoom/in-person therefore please check online to verify*  Need Support Now? If you or someone you know is struggling or in crisis, help is available. Call or text 128 or chat Responsa.org  AA meetings search for them at: https://aa-intergroup.org (worldwide meeting listings)  AA meetings for [place] area can be found online at: https://a[place].org (click local online meetings listings)  NA meetings for [place] area can be found online at: https://www.nam[place].org  (click find a meeting)  AA and NA Sponsors are excellent resources for support and you can find one at any support group meeting.   Alcoholics Anonymous (https://aa.org/): for information 24 hours/day  AA Intergroup service office in [place] (http://www.aastpaul.org/) 298.158.2012  AA Intergroup service office in [facility]: 796.294.6198. (http://www.aaminneaFemmePharma Global Healthcareis.org/)  Narcotics Anonymous (www.nam[place].org) (566) 152-6828  https://aafairviewriverside.org/meetings  SMART Recovery - self management for addiction recovery:  www.smartrecovery.org  Pathways ~ A Health Crisis Resource & Support Center:  321.700.5660.  https://prescribetoprevent.org/patient-education/videos/  http://www.harmreduction.org  Crisis Text Line  Text 864634  You will be connected with a trained live crisis counselor to provide support. Por espanol, texto  JEANETH a 128349 o texto a 442-AYUDAME en WhatsA  National  "Hope Line  1.800.SUICIDE [3390654]  [facility] 318-502-4910  Support Group:  AA/NA and Sponsor/support.  Fast Tracker  Linking people to mental health and substance use disorder resources  [employer] network.[employer]   [place] Mental Health Warm Line  Peer to peer support  Monday thru Saturday, 12 pm to 10 pm  000.425.3478 or 8.938.783.8011  Text \"Support\" to 07220  National [place] on Mental Illness (SUE)  830.732.0061 or 1.888.SUE.HELPS  Alcoholics Anonymous (www.alcoholics-anonymous.org): Check your phone book for your local chapter.  Suicide Awareness Voices of Education (SAVE) (www.save.org): 995-313-KPQS (1783)  National Suicide Prevention Line (www.mentalhealthmn.org): 688-127-PCYP (1977)  Mental Health Consumer/Survivor Network of [place] (www.mhcsn.net): 474.318.9075 or 022-974-7797  Mental Health Association of [place] (www.mentalhealth.org): 550.854.3405 or 494-199-0114   Substance Abuse and Mental Health Services (www.samhsa.gov)  [place] Opioid Prevention Coalition: www.opioidcoalition.org    [place] Recovery Connection (MRC)  [facility] connects people seeking recovery to resources that help foster and sustain long-term recovery.  Whether you are seeking resources for treatment, transportation, housing, job training, education, health care or other pathways to recovery, [facility] is a great place to start.  131.614.5597.  www.[place]are[place]ia[place].org    Great Pod casts for nutrition and wellness  Listen on Apple Podcasts  Dishing Up Nutrition   [employer]   Nutrition       Understand the connection between what you eat and how you feel. Hosted by licensed nutritionists and dietitians from [employer] we share practical, real-life solutions for healthier living through nutrition.     General Medication Instructions:   See your medication sheet(s) for instructions.   Take all medications as prescribed.  Make no changes unless your primary care provider suggests " them.   Go to all your primary care provider visits.  Be sure to have all your required lab tests. This way, your medicines can be refilled on time.  Do not use any forms of alcohol.    Please Note: If you have any questions at anytime after you discharged please call [facility] detox unit 3A at [phone].    Here are a list of additional numbers you can call if you are wanting to resume services through [facility]:  [facility] Assessment Intake: [phone]  [facility] Adult DANIELLE Intensive Outpatient  call: [phone]  [facility] Admissions [phone]    Recovery Clinic call: [phone]  [facility]: [phone]  Medical Records call: [phone]  Billing Department call: [phone]    Please remember to take all of your behavioral discharge planning and lab paperwork to any follow up appointments, it contains your lab results, diagnosis, medication list and discharge recommendations.      THANK YOU FOR CHOOSING [facility]

## 2025-01-16 NOTE — PHARMACY-ADMISSION MEDICATION HISTORY
Pharmacist Admission Medication History    Admission medication history is complete. The information provided in this note is only as accurate as the sources available at the time of the update.    Information Source(s): Patient via phone    Pertinent Information: Pt states that he only uses his ventolin inhaler (last used yesterday). Pt states that he use to have metoprolol, symbicort, multivitamin, and vitamin D but he ran out in October 2024    Changes made to PTA medication list:  Added: None  Deleted: metoprolol, symbicort, lisinopril, multivitamin, mirtazapine, and vitamin D  Changed: None    Allergies reviewed with patient and updates made in EHR: yes    Medication History Completed By: Aura Kramer RPH 1/16/2025 8:37 AM    PTA Med List   Medication Sig Last Dose/Taking    albuterol (PROAIR HFA/PROVENTIL HFA/VENTOLIN HFA) 108 (90 Base) MCG/ACT inhaler Inhale 2 puffs into the lungs every 6 hours as needed for shortness of breath, wheezing or cough 1/15/2025

## 2025-01-16 NOTE — ED TRIAGE NOTES
Seeking detox off of alcohol. Drinks neha, last drink a few hours ago. Denies withdrawal seizure hx.     Triage Assessment (Adult)       Row Name 01/15/25 4329          Triage Assessment    Airway WDL WDL        Respiratory WDL    Respiratory WDL WDL        Skin Circulation/Temperature WDL    Skin Circulation/Temperature WDL WDL        Cardiac WDL    Cardiac WDL WDL        Peripheral/Neurovascular WDL    Peripheral Neurovascular WDL WDL        Cognitive/Neuro/Behavioral WDL    Cognitive/Neuro/Behavioral WDL WDL

## 2025-01-16 NOTE — PLAN OF CARE
Behavioral Team Discussion: (1/16/2025)    Continued Stay Criteria/Rationale: Patient admitted for Chemical Use Issues.  Plan: The following services will be provided to the patient; psychiatric assessment, medication management, therapeutic milieu, individual and group support, and skills groups.   Participants: 3A Provider: Dr. Angela Nix MD; 3A RN:  Didier Nielsen RN; 3A CM's:  PIERRE Perkins .  Summary/Recommendation: Providers will assess today for treatment recommendations, discharge planning, and aftercare plans. CM will meet with pt for discharge planning.   Medical/Physical: Pt reported feeling wheezy. He has history of asthma.   Precautions:   Behavioral Orders   Procedures    Code 1 - Restrict to Unit    Routine Programming     As clinically indicated    Status 15     Every 15 minutes.    Suicide precautions: Suicide Risk: LOW; Clinical rationale to override score: modification to the care environment     Consider searching patient belongings according to policy for contraband or items that could be used for self-harm.     Order Specific Question:   Suicide Risk     Answer:   LOW     Order Specific Question:   Clinical rationale to override score:     Answer:   modification to the care environment    Withdrawal precautions     Rationale for change in precautions or plan: N/A  Progress: Initial.    ASAM Dimension Scale Ratings:  Dimension 1: 3 Client tolerates and deya with withdrawal discomfort poorly. Client has severe intoxication, such that the client endangers self or others, or intoxication has not abated with less intensive levels of services. Client displays severe signs and symptoms; or risk of severe, but manageable withdrawal; or withdrawal worsening despite detox at less intensive level.  Dimension 2: 1 Client tolerates and deya with physical discomfort and is able to get the services that the client needs.  Dimension 3: 2 Client has difficulty with impulse control and lacks  coping skills. Client has thoughts of suicide or harm to others without means; however, the thoughts may interfere with participation in some treatment activities. Client has difficulty functioning in significant life areas. Client has moderate symptoms of emotional, behavioral, or cognitive problems. Client is able to participate in most treatment activities.  Dimension 4: 2 Client displays verbal compliance, but lacks consistent behaviors; has low motivation for change; and is passively involved in treatment.  Dimension 5: 4 No awareness of the negative impact of mental health problems or substance abuse. No coping skills to arrest mental health or addiction illnesses, or prevent relapse.  Dimension 6: 3 Client is not engaged in structured, meaningful activity and the client's peers, family, significant other, and living environment are unsupportive, or there is significant criminal justice system involvement.

## 2025-01-16 NOTE — CONSULTS
"Diagnostic Evaluation Consultation  Crisis Assessment    Patient Name: John Paul Velazquez  Age:  54 year old  Legal Sex: male  Gender Identity: male  Pronouns:   Race: White  Ethnicity: Data Unavailable  Language: Data Unavailable      Patient was assessed: In person   Crisis Assessment Start Date: 01/15/25  Crisis Assessment Start Time: 2120  Crisis Assessment Stop Time: 2140  Patient location: [facility] EMERGENCY DEPARTMENT                             URE-    Referral Data and Chief Complaint  John Paul Velazquez presents to the ED by  self. Patient is presenting to the ED for the following concerns: Intoxication. Factors that make the mental health crisis life threatening or complex are: Pt brought self to the ED seeking detox from alcohol.  During triage he identified that he was feeling depressed and hopeless. A DEC assessment was requested.  Pt staes that he had been sober for 13 months prior to relapsing on alcohol in 10/2024.  He has been drinking a pint to a liter of alcohol per day since that time. His last use was 1/15. He has thoughts that he would like to drink himself to death.    He lives alone and is hoping to rebuild relationships with his kids once he is sober.   He denies a plan or intentions to end his life.  \"I am here to stay alive and to get healthy.\".      Informed Consent and Assessment Methods  Explained the crisis assessment process, including applicable information disclosures and limits to confidentiality, assessed understanding of the process, and obtained consent to proceed with the assessment.  Assessment methods included conducting a formal interview with patient, review of medical records, collaboration with medical staff, and obtaining relevant collateral information from family and community providers when available.  : done     History of the Crisis   Pt with history of alcohol abuse.  He lives alone. He is currently unemployed and having financial problems.    Brief " Psychosocial History  Family:  Single, Children yes  Support System:  Children, Friend  Employment Status:  unemployed  Source of Income:  none  Financial Environmental Concerns:  money taken/used without patient permission, unable to afford rent/mortgage  Current Hobbies:     Barriers in Personal Life:       Significant Clinical History  Current Anxiety Symptoms:  anxious  Current Depression/Trauma:  thoughts of death/suicide  Current Somatic Symptoms:  anxious  Current Psychosis/Thought Disturbance:   (denies)  Current Eating Symptoms:   (denies)  Chemical Use History:  Alcohol: Daily  Last Use:: 01/15/25  Benzodiazepines: None  Opiates: None  Cocaine: None  Marijuana: Occasional  Other Use: None  Withdrawal Symptoms:  (denies current. No history of withdrawal seizures.)   Past diagnosis:  Substance Use Disorder  Family history:  No known history of mental health or chemical health concerns  Past treatment:  Other  Details of most recent treatment:  chemical dependency treatment.   Most recent [facility] several years ago  Other relevant history:       Have there been any medication changes in the past two weeks:  patient is not on psychiatric meds       Is the patient compliant with medications:           Collateral Information  Is there collateral information: No      Risk Assessment  Burton Suicide Severity Rating Scale Full Clinical Version:  Suicidal Ideation  Q6 Suicide Behavior (Lifetime): no      Burton Suicide Severity Rating Scale Recent:   Suicidal Ideation (Recent)  Q1 Wished to be Dead (Past Month): yes  Q2 Suicidal Thoughts (Past Month): yes  Q3 Suicidal Thought Method: no  Q4 Suicidal Intent without Specific Plan: no  Q5 Suicide Intent with Specific Plan: no  Level of Risk per Screen: low risk  Intensity of Ideation (Recent)  Most Severe Ideation Rating (Past 1 Month): 2  Frequency (Past 1 Month): 2-5 times in week  Duration (Past 1 Month): Less than 1 hour/some of the time  Controllability (Past 1  Month): Easily able to control thoughts  Deterrents (Past 1 Month): Deterrents definitely stopped you from attempting suicide  Reasons for Ideation (Past 1 Month): Completely to end or stop the pain (You couldn't go on living with the pain or how you were feeling)  Suicidal Behavior (Recent)  Actual Attempt (Past 3 Months): No  Has subject engaged in non-suicidal self-injurious behavior? (Past 3 Months): No  Interrupted Attempts (Past 3 Months): No  Aborted or Self-Interrupted Attempt (Past 3 Months): No  Preparatory Acts or Behavior (Past 3 Months): No    Environmental or Psychosocial Events: work or task failure, geographic isolation from supports, excessive debt, poor finances  Protective Factors: Protective Factors: strong bond to family unit, community support, or employment, responsibilities and duties to others, including pets and children, able to access care without barriers, help seeking    Does the patient have thoughts of harming others? Feels Like Hurting Others: no  Previous Attempt to Hurt Others: no  Current presentation:  (calm and cooperative)  Is the patient engaging in sexually inappropriate behavior?: no  Does Patient have a known history of aggressive behavior: No    Is the patient engaging in sexually inappropriate behavior?  no        Mental Status Exam   Affect: Appropriate  Appearance: Appropriate  Attention Span/Concentration: Attentive  Eye Contact: Engaged    Fund of Knowledge: Appropriate   Language /Speech Content: Fluent  Language /Speech Volume: Normal  Language /Speech Rate/Productions: Normal  Recent Memory: Intact  Remote Memory: Intact  Mood: Sad  Orientation to Person: Yes   Orientation to Place: Yes  Orientation to Time of Day: Yes  Orientation to Date: Yes     Situation (Do they understand why they are here?): Yes  Psychomotor Behavior: Normal  Thought Content: Clear  Thought Form: Intact, Goal Directed     Medication  Psychotropic medications:   Medication Orders -  "Psychiatric (From admission, onward)      Start     Dose/Rate Route Frequency Ordered Stop    01/15/25 1909  LORazepam (ATIVAN) tablet 1-2 mg        Placed in \"Or\" Linked Group    1-2 mg Oral EVERY 30 MIN PRN 01/15/25 1909      01/15/25 1909  LORazepam (ATIVAN) injection 1-2 mg        Placed in \"Or\" Linked Group    1-2 mg Intravenous EVERY 30 MIN PRN 01/15/25 1909               Current Care Team  No care team member to display    Diagnosis  Patient Active Problem List   Diagnosis Code    S/P alcohol detoxification Z92.89    Alcohol abuse F10.10    Depression, unspecified depression type F32.A       Primary Problem This Admission  Active Hospital Problems    Alcohol abuse      Depression, unspecified depression type      Clinical Summary and Substantiation of Recommendations   Clinical Substantiation:  Pt presents with ongoing alcohol abuse concerns. He was screened for imminent risk and there not imminent risk. He has passive suicidal thoughts, no plan or intentions. He is appropriate for detox treatment.    Goals for crisis stabilization:  Detox    Next steps for Care Team:  referrals for CD treatment    Treatment Objectives Addressed:  rapport building, identifying treatment goals    Therapeutic Interventions:  Completed behavior chain analysis.    Has a specific means been identified for suicidal/homicide actions: No    Patient coping skills attempted to reduce the crisis:  came to the ED    Disposition  Recommended referrals:          Reviewed case and recommendations with attending provider. Attending Name: Dr. Berry       Attending concurs with disposition: yes       Patient and/or validated legal guardian concurs with disposition:   yes       Final disposition:   (Detox)      Legal status: Voluntary/Patient has signed consent for treatment                               Assessment Details   Total duration spent with the patient: 20 min     CPT code(s) utilized: 19297 - Psychotherapy (with patient) - 30 " (16-37*) courtney Villatoro, [facility], Psychotherapist  DEC - Triage & Transition Services

## 2025-01-16 NOTE — PROGRESS NOTES
S; Patient admitted via ED for alcohol withdrawal.    B: Patient has been drinking 1 liter neha daily,last use 12/15/25@1500. Patient denies history of seizures or Dt's but has had difficult withdrawal. Patient has COPD and is given a nebulizer and started on prednisone in the ED. Patient has a low Potassium level at 2.9 and it is replaced orally in the ED. Patient denies any other chronic or acute illness. Patient has a cough that he describes as chronic and tests negative for viral panel. Patient denies any formal psychiatric history but has had some suicidal ideation in the form of drinking himself to death which he clarifies as meaning not trying to be sober. Patient states that he feels safe in the hospital and would tell staff about any worsening suicidal ideation. Patient denies any past attempts.    A: Patient is in mild alcohol withdrawal on admission.    R; Monitor and treat per MSSA with valium protocol. Withdrawal and seizure precautions,as well as 15 min checks for safety.

## 2025-01-16 NOTE — PLAN OF CARE
Problem: Adult Inpatient Plan of Care  Goal: Absence of Hospital-Acquired Illness or Injury  Outcome: Progressing     Problem: Adult Inpatient Plan of Care  Goal: Optimal Comfort and Wellbeing  Outcome: Progressing  Infection and accident prevention protocols are in place to help prevent injury and/or illness to pt. Pt has PRN medication available to assist with dysregulation and coping skills as well as therapeutic groups and one on one time with staff to help with comfort and wellbeing. Pt presents as polite, pleasant and cooperative. He denies SI/SIB/HI/AVH. He has taken all his medication as prescribed. He does present with some shortness of breath and is hypertensive. Medical has been consulted and he has had some medication changes. He has scored 9,8,12 on MSSA's and has received 10mg of valium for each occurrence. He is tremulous, reports poor sleep, but his appetite is good. He has been approved for financial aid and has been given his ID number.

## 2025-01-16 NOTE — ED PROVIDER NOTES
"    [facility] EMERGENCY DEPARTMENT ([facility])    1/15/25      ED PROVIDER NOTE       History     Chief Complaint   Patient presents with    Alcohol Problem     Seeking detox off of alcohol. Drinks neha, last drink a few hours ago. Denies withdrawal seizure hx.     HPI  John Paul Velazquez is a 54 year old male HTN, asthma, CHF, tobacco use, alcohol abuse, who presents to the ED seeking detox from alcohol.  He has been drinking daily for the last few months.  He states he initially started drinking a pint but this has increased to up to nearly a liter. He has had withdrawal before but denies seizures or DT's.     He states he has also been feeling more depressed and anxious. He states he has been depressed about his drinking. He was on anti-depressants before but not right now. He states he has had thoughts of suicide and \"drinking myself to death.\"    The patient also reports feeling wheezy today. He has history of asthma. He did not use his inhaler today. There has been a non productive cough with this.      Patient reports drinking Neha, last drink was a few hours prior to arrival. Denies history of alcohol withdrawal seizures or DTs.    He uses tobacco and marijuana but denies other substance use.    Past Medical History  History reviewed. No pertinent past medical history.  History reviewed. No pertinent surgical history.  albuterol (PROAIR HFA/PROVENTIL HFA/VENTOLIN HFA) 108 (90 Base) MCG/ACT inhaler  budesonide-formoterol (SYMBICORT) 160-4.5 MCG/ACT Inhaler  folic acid (FOLVITE) 1 MG tablet  furosemide (LASIX) 40 MG tablet  lisinopril (ZESTRIL) 10 MG tablet  metoprolol succinate ER (TOPROL XL) 25 MG 24 hr tablet  mirtazapine (REMERON) 15 MG tablet  multivitamin w/minerals (THERA-VIT-M) tablet  potassium chloride ER (K-TAB/KLOR-CON) 10 MEQ CR tablet      No Known Allergies  Family History  History reviewed. No pertinent family history.  Social History   Social History     Tobacco Use    Smoking status: " "Every Day     Types: Cigarettes    Smokeless tobacco: Never   Substance Use Topics    Alcohol use: Yes      Past medical history, past surgical history, medications, allergies, family history, and social history were reviewed with the patient. No additional pertinent items.   A medically appropriate review of systems was performed with pertinent positives and negatives noted in the HPI, and all other systems negative.    Physical Exam   BP: 114/80  Pulse: 113  Temp: 98.2  F (36.8  C)  Resp: 16  Height: 165.1 cm (5' 5\")  Weight: 59 kg (130 lb)  SpO2: 94 %  Physical Exam  Vitals and nursing note reviewed.   Constitutional:       General: He is not in acute distress.     Appearance: Normal appearance. He is not toxic-appearing.   HENT:      Head: Atraumatic.   Eyes:      General: No scleral icterus.     Conjunctiva/sclera: Conjunctivae normal.   Cardiovascular:      Rate and Rhythm: Normal rate.      Heart sounds: Normal heart sounds.   Pulmonary:      Effort: Pulmonary effort is normal. No respiratory distress.      Breath sounds: Wheezing present. No rales.   Abdominal:      Palpations: Abdomen is soft.      Tenderness: There is no abdominal tenderness.   Musculoskeletal:         General: No deformity.      Cervical back: Neck supple.   Skin:     General: Skin is warm.   Neurological:      Mental Status: He is alert.           ED Course, Procedures, & Data      Procedures                Results for orders placed or performed during the hospital encounter of 01/15/25   Comprehensive metabolic panel     Status: Abnormal   Result Value Ref Range    Sodium 139 135 - 145 mmol/L    Potassium 2.9 (L) 3.4 - 5.3 mmol/L    Carbon Dioxide (CO2) 28 22 - 29 mmol/L    Anion Gap 14 7 - 15 mmol/L    Urea Nitrogen 12.4 6.0 - 20.0 mg/dL    Creatinine 0.99 0.67 - 1.17 mg/dL    GFR Estimate >90 >60 mL/min/1.73m2    Calcium 8.0 (L) 8.8 - 10.4 mg/dL    Chloride 97 (L) 98 - 107 mmol/L    Glucose 102 (H) 70 - 99 mg/dL    Alkaline " Phosphatase 65 40 - 150 U/L    AST 88 (H) 0 - 45 U/L    ALT 79 (H) 0 - 70 U/L    Protein Total 6.7 6.4 - 8.3 g/dL    Albumin 4.5 3.5 - 5.2 g/dL    Bilirubin Total 0.4 <=1.2 mg/dL   Lipase     Status: Abnormal   Result Value Ref Range    Lipase 80 (H) 13 - 60 U/L   Magnesium     Status: Normal   Result Value Ref Range    Magnesium 2.1 1.7 - 2.3 mg/dL   Alcohol level blood     Status: Abnormal   Result Value Ref Range    Alcohol ethyl 0.23 (H) <=0.01 g/dL   Influenza A/B, RSV and SARS-CoV2 PCR (COVID-19) Nasopharyngeal     Status: Normal    Specimen: Nasopharyngeal; Swab   Result Value Ref Range    Influenza A PCR Negative Negative    Influenza B PCR Negative Negative    RSV PCR Negative Negative    SARS CoV2 PCR Negative Negative    Narrative    Testing was performed using the Xpert Xpress CoV2/Flu/RSV Assay on the RE2 GeneXpert Instrument. This test should be ordered for the detection of SARS-CoV2, influenza, and RSV viruses in individuals with signs and symptoms of respiratory tract infection. This test is for in vitro diagnostic use under the [place] FDA for laboratories certified under CLIA to perform high or moderate complexity testing. This test has been [place] FDA cleared. A negative result does not rule out the presence of PCR inhibitors in the specimen or target RNA in concentration below the limit of detection for the assay. If only one viral target is positive but coinfection with multiple targets is suspected, the sample should be re-tested with another FDA cleared, approved, or authorized test, if coninfection would change clinical management. This test was validated by the [facility]. These laboratories are certified under the Clinical Laboratory Improvement Amendments of 1988 (CLIA-88) as qualified to perfom high complexity laboratory testing.   CBC with platelets and differential     Status: Abnormal   Result Value Ref Range    WBC Count 8.0 4.0 - 11.0 10e3/uL    RBC Count 4.97 4.40 -  5.90 10e6/uL    Hemoglobin 15.1 13.3 - 17.7 g/dL    Hematocrit 42.8 40.0 - 53.0 %    MCV 86 78 - 100 fL    MCH 30.4 26.5 - 33.0 pg    MCHC 35.3 31.5 - 36.5 g/dL    RDW 15.9 (H) 10.0 - 15.0 %    Platelet Count 336 150 - 450 10e3/uL    % Neutrophils 74 %    % Lymphocytes 17 %    % Monocytes 7 %    % Eosinophils 0 %    % Basophils 1 %    % Immature Granulocytes 0 %    NRBCs per 100 WBC 0 <1 /100    Absolute Neutrophils 5.9 1.6 - 8.3 10e3/uL    Absolute Lymphocytes 1.4 0.8 - 5.3 10e3/uL    Absolute Monocytes 0.6 0.0 - 1.3 10e3/uL    Absolute Eosinophils 0.0 0.0 - 0.7 10e3/uL    Absolute Basophils 0.1 0.0 - 0.2 10e3/uL    Absolute Immature Granulocytes 0.0 <=0.4 10e3/uL    Absolute NRBCs 0.0 10e3/uL   Urine Drug Screen Panel     Status: Abnormal   Result Value Ref Range    Amphetamines Urine Screen Negative Screen Negative    Barbituates Urine Screen Negative Screen Negative    Benzodiazepine Urine Screen Negative Screen Negative    Cannabinoids Urine Screen Positive (A) Screen Negative    Cocaine Urine Screen Negative Screen Negative    Fentanyl Qual Urine Screen Negative Screen Negative    Opiates Urine Screen Negative Screen Negative    PCP Urine Screen Negative Screen Negative   Alcohol breath test POCT     Status: Abnormal   Result Value Ref Range    Alcohol Breath Test 0.183 (A) 0.00 - 0.01   Urine Drug Screen     Status: Abnormal    Narrative    The following orders were created for panel order Urine Drug Screen.  Procedure                               Abnormality         Status                     ---------                               -----------         ------                     Urine Drug Screen Panel[[id number]]      Abnormal            Final result                 Please view results for these tests on the individual orders.   CBC with platelets differential     Status: Abnormal    Narrative    The following orders were created for panel order CBC with platelets differential.  Procedure                                Abnormality         Status                     ---------                               -----------         ------                     CBC with platelets and d...[[id number]]  Abnormal            Final result                 Please view results for these tests on the individual orders.     Medications   flumazenil (ROMAZICON) injection 0.2 mg (has no administration in time range)   melatonin tablet 5 mg (has no administration in time range)   LORazepam (ATIVAN) tablet 1-2 mg (1 mg Oral $Given 1/15/25 2143)     Or   LORazepam (ATIVAN) injection 1-2 mg ( Intravenous See Alternative 1/15/25 2143)   multivitamin w/minerals (THERA-VIT-M) tablet 1 tablet (1 tablet Oral $Given 1/15/25 1937)   thiamine (B-1) tablet 100 mg (100 mg Oral $Given 1/15/25 1937)   folic acid (FOLVITE) tablet 1 mg (1 mg Oral $Given 1/15/25 1937)   albuterol (PROVENTIL HFA/VENTOLIN HFA) inhaler (2 puffs Inhalation $Given 1/15/25 1937)   predniSONE (DELTASONE) tablet 40 mg (40 mg Oral $Given 1/15/25 1937)   potassium chloride brittani ER (KLOR-CON M20) CR tablet 60 mEq (60 mEq Oral $Given 1/15/25 2034)     Labs Ordered and Resulted from Time of ED Arrival to Time of ED Departure   COMPREHENSIVE METABOLIC PANEL - Abnormal       Result Value    Sodium 139      Potassium 2.9 (*)     Carbon Dioxide (CO2) 28      Anion Gap 14      Urea Nitrogen 12.4      Creatinine 0.99      GFR Estimate >90      Calcium 8.0 (*)     Chloride 97 (*)     Glucose 102 (*)     Alkaline Phosphatase 65      AST 88 (*)     ALT 79 (*)     Protein Total 6.7      Albumin 4.5      Bilirubin Total 0.4     LIPASE - Abnormal    Lipase 80 (*)    ETHYL ALCOHOL LEVEL - Abnormal    Alcohol ethyl 0.23 (*)    CBC WITH PLATELETS AND DIFFERENTIAL - Abnormal    WBC Count 8.0      RBC Count 4.97      Hemoglobin 15.1      Hematocrit 42.8      MCV 86      MCH 30.4      MCHC 35.3      RDW 15.9 (*)     Platelet Count 336      % Neutrophils 74      % Lymphocytes 17      % Monocytes 7      %  Eosinophils 0      % Basophils 1      % Immature Granulocytes 0      NRBCs per 100 WBC 0      Absolute Neutrophils 5.9      Absolute Lymphocytes 1.4      Absolute Monocytes 0.6      Absolute Eosinophils 0.0      Absolute Basophils 0.1      Absolute Immature Granulocytes 0.0      Absolute NRBCs 0.0     URINE DRUG SCREEN PANEL - Abnormal    Amphetamines Urine Screen Negative      Barbituates Urine Screen Negative      Benzodiazepine Urine Screen Negative      Cannabinoids Urine Screen Positive (*)     Cocaine Urine Screen Negative      Fentanyl Qual Urine Screen Negative      Opiates Urine Screen Negative      PCP Urine Screen Negative     ALCOHOL BREATH TEST POCT - Abnormal    Alcohol Breath Test 0.183 (*)    MAGNESIUM - Normal    Magnesium 2.1     INFLUENZA A/B, RSV AND SARS-COV2 PCR - Normal    Influenza A PCR Negative      Influenza B PCR Negative      RSV PCR Negative      SARS CoV2 PCR Negative       No orders to display          Critical care was not performed.     Medical Decision Making  The patient's presentation was of high complexity (a chronic illness severe exacerbation, progression, or side effect of treatment).    The patient's evaluation involved:  review of external note(s) from 3+ sources (ED notes, clinic note, telephone,)  review of 3+ test result(s) ordered prior to this encounter (CBC, CMP, viral panel)  ordering and/or review of 3+ test(s) in this encounter (see separate area of note for details)  discussion of management or test interpretation with another health professional (see separate area of note for details)    The patient's management necessitated high risk (a decision regarding hospitalization).    Assessment & Plan    John Paul Velazquez is a 54 year old male HTN, asthma, CHF, tobacco use, alcohol abuse, who presents to the ED seeking detox from alcohol.  The patient was overall well-appearing and vitals were reassuring.  He has history of asthma and had some wheezing on arrival.  He  stated that he had not taken his negative patients today as he typically would.  He was given albuterol and prednisone for suspected asthma exacerbation.  No respiratory distress or hypoxia.    Patient also noted some feelings of suicidal ideation and a DEC assessment was requested, they evaluated the patient and felt he is appropriate for detox and did not recommend mental health admission.    Labs were obtained for clearance for detox and showed a alcohol of 0.23.  Potassium was low and supplemented.  Labs otherwise not suggestive of other acute process.  He was tested for COVID and flu and RSV this was negative.    The patient was started on the MSSA protocol and was discussed with detox intake to admit him to detox.    I have reviewed the nursing notes. I have reviewed the findings, diagnosis, plan and need for follow up with the patient.    New Prescriptions    No medications on file       Final diagnoses:   Alcohol abuse       Christopher Berry MD  [facility] EMERGENCY DEPARTMENT  1/15/2025     Christopher Berry MD  01/15/25 1612

## 2025-01-16 NOTE — TELEPHONE ENCOUNTER
Wiley paged from previous     11:43pm - Dr. Albrecht accepts pt for Detox 3A    12:01am - Notified unit of pt in the queue    12:03am - Notified ED of pt placement    Indicia Completed S.R    R: Patient Placement to Detox 3A/CD/Dr. Nix

## 2025-01-16 NOTE — ED NOTES
"Pt endorsed SI thoughts and plan to \"drink self to death\", but states \"I'm here so I want help, I dont want to die\". Pt willing to have DEC assessment.   "

## 2025-01-16 NOTE — CONSULTS
[facility]  Internal Medicine Consult     John Paul Velazquez MRN# [id number]   Age: 54 year old YOB: 1970     Date of Admission: 1/15/2025  Date of Consult: 1/16/2025    Primary Care Provider: No Ref-Primary, Physician    Requesting Service: Behavioral Health - Angela Nix MD  Reason for Consult: General Medical Evaluation      SUBJECTIVE   CC:   Alcohol Withdrawal     Assessment and Plan/Recommendations:     John Paul Velazquez is a 54 year old male with PMHx significant for hypertension, asthma, COPD, CHF, alcohol abuse.  Patient was admitted to station 3A for detox. Internal medicine consulted for medical co-management.    # Alcohol withdrawal, hx of alcohol use disorder   MSSA 9 this shift.  Denies hx of withdrawal seizures.  Pt reports drinking neha daily. Blood EtOH on arrival was 0.23.   - Continue MSSA   - Folvite, multi-vites, thiamine supplementation   - Further management per Psychiatry     # COPD  # Asthma  Patient reportedly had wheezing on arrival to ER and was given some prednisone.  Flu/COVID/RSV swab was negative.  Has some CO2 retention on metabolic panel. He does have significant wheezing on exam and he feels somewhat SOB.  - Scheduled albuterol QID x 2 days   - Ordered Prednisone 40 mg x 5 days   - PTA Symbicort  - PTA albuterol as needed    #CHF  #Hypertension  Reviewed most recent cardiology note dated 7/10/2024.  Note from March 2023 reports EF as low as 25% but this was improved on echo 7/10/2024 with LVEF up to 44%, grade 1 diastolic dysfunction and mild LV thickening.  Per chart review, it like CHF was due to combination of cocaine and alcohol use.  Patient used to be on Lasix, lisinopril and metoprolol but with improvement in EF lisinopril and Lasix were discontinued and he was continued on metoprolol 12.5 mg daily.  Patient reports he self stopped metoprolol a few months ago.  - Suspect patient's acute hypertension is more related to withdrawal than  "it is underlying pathology.  However, he likely does have some degree of baseline hypertension  - Will also note that pt is likely to have some additional elevation in BP secondary to steroids   - Resume Metoprolol XL12.5 mg daily  - hydralazine 10 mg PO q 4 hours PRN for SBP > 180 or DBP > 120  - Patient should follow close with PCP after discharge for ongoing blood pressure management    #Prediabetes  A1c 6.4% on arrival  - Alcohol cessation  - Lifestyle modifications including creased activity with working out 3 to 5 days weekly and Mediterranean diet  - Follow-up with PCP    #Suicidal ideation  #Anxiety and depression  Pt reported suicidal ideation in the ER with an intention to \"drink myself to death.\"  - Management per psychiatry      Currently, medically stable and internal medicine will sign off. Please contact if future questions or concerns arise. Thank you for the opportunity to be a part of this patient's care.        John Paul Thomason PA-C  Internal Medicine [facility] Hospitalist  Page job code [id number] (3B), [phone] (3A), or [phone] ([facility] and )  Text paging via [employer] is appreciated  January 16, 2025         HPI:     Patient resting comfortably in bed on exam.  He does report feeling a little short of breath and feeling wheezy.  Denies chest pain or palpitations.  No abdominal pain but is having some nausea but no vomiting.  Reports he has been drinking daily for at least the past 4 months.  Denies history of withdrawal seizures.  We discussed his medications.  He says he used to be on 3 medications for his CHF but was told to stop 2 of them and then he self stopped the third -he thinks this medication was metoprolol.         Past Medical History:   History reviewed. No pertinent past medical history.     Reviewed and updated in [employer].     Past Surgical History:    History reviewed. No pertinent surgical history.      Social History:     Social History     Tobacco Use    Smoking status: Every Day     Types: " "Cigarettes    Smokeless tobacco: Never   Substance Use Topics    Alcohol use: Yes        Family History:   History reviewed. No pertinent family history.      Allergies:   No Known Allergies      Medications:   Reviewed. Please see MAR     Review of Systems:   10 point ROS of systems including Constitutional, Eyes, Respiratory, Cardiovascular, Gastroenterology, Genitourinary, Integumentary, Muscularskeletal, Psychiatric were all negative except for pertinent positives noted in my HPI.    OBJECTIVE   Physical Exam:   Vitals were reviewed  Blood pressure (!) 163/134, pulse 82, temperature 97.7  F (36.5  C), temperature source Oral, resp. rate 16, height 1.651 m (5' 5\"), weight 59 kg (130 lb), SpO2 94%.  General: Alert and oriented x3. Somewhat tremulous    EYES: PERRLA, EOM. Anicteric   HENT: Atraumatic.   Lungs: No increased work of breathing. Bilateral wheezing   Cards: RRR, no m/r/g appreciated    GI: Abdomen soft, non-tender without rebound or guarding. + Bowel sounds.  Neurologic: No focal deficits, CN II-XII grossly intact  Skin: No jaundice, rashes, or lesions        Data:        Lab Results   Component Value Date     01/16/2025    Lab Results   Component Value Date    CHLORIDE 103 01/16/2025    Lab Results   Component Value Date    BUN 15.8 01/16/2025      Lab Results   Component Value Date    POTASSIUM 4.0 01/16/2025    Lab Results   Component Value Date    CO2 30 01/16/2025    Lab Results   Component Value Date    CR 0.81 01/16/2025        Lab Results   Component Value Date    WBC 8.0 01/15/2025    HGB 15.1 01/15/2025    HCT 42.8 01/15/2025    MCV 86 01/15/2025     01/15/2025     Lab Results   Component Value Date    WBC 8.0 01/15/2025           "

## 2025-01-16 NOTE — TELEPHONE ENCOUNTER
S: [facility] , Provider Julius calling at 8:42 PM with clinical on a 54 year old/Male presenting for alcohol detox.     B: Pt presents for ETOH detox.   Currently reports drinking approximately 1 pint to 1 liter daily for a long time.    Patient reports last use was a few hours before ED.  Pt BAC: .23  Pt  denies hx of DT  Pt  denies hx of seizures. Last seizure: N/A  Pt endorsing the following symptoms of withdrawal:  still intoxicated  MSSA Score: Unknown still intoxicated    Pt denies acute mental health or medical concerns.   -Pt does report a Hx of passive SI   Pt endorses other drug use: (!) CANNABIS PRODUCTS Amount/frequency:  frequently    Does Pt have a detox care plan in Epic? None  Does pt present with specific needs, assistive devices, or exclusionary criteria? None  Is the patient ambulating, eating and drinking in the ED? Yes    A: Pt meets criteria to be presented for IP detox admission. Patient is voluntary    COVID Symptoms: No  If yes, COVID test required   Utox: Positive for    THC  Magnesium: WNL  CMP: Abnormalities: Potassium is 2.9 but replacing in ED ; AST is 88 and ALT is 79  CBC: WNL  HCG: N/A     R: Patient cleared and ready for behavioral bed placement: Yes    Pt is meeting criteria for presentation to 3A/CD    Does Patient need a Transfer Center request created? No, Pt is located within [facility] ED, [facility] ED, or [facility] ED     8:17 PM Paged unit 3A On Call Provider    9:21 PM re-paged unit 3A On Call Provider    10:22 PM Called [facility] and put Pt onto list

## 2025-01-16 NOTE — PLAN OF CARE
01/16/25 0331   Patient Belongings   Did you bring any home meds/supplements to the hospital?  Yes   Disposition of meds  Sent to security/pharmacy per site process   Patient Belongings Put in Hospital Secure Location (Security or Locker, etc.) clothing;shoes;cell phone/electronics;medication(s);cash/credit card   Comment Shoes,Socks,Sweetpant, Jeans Pant, Long sleeve shirt, Jacket,Cell phone, Big lighter,Winter cap, Waist belt, Two sticks of Cigarettes, Medication, [place] ID, and Cash- $428.20 cent.     A               Admission:  I am responsible for any personal items that are not sent to the safe or pharmacy.  [facility] is not responsible for loss, theft or damage of any property in my possession.    Signature:  _________________________________ Date: _______  Time: _____                                              Staff Signature:  ____________________________ Date: ________  Time: _____      2nd Staff person, if patient is unable/unwilling to sign:    Signature: ________________________________ Date: ________  Time: _____     Discharge:  [facility] has returned all of my personal belongings:    Signature: _________________________________ Date: ________  Time: _____                                          Staff Signature:  ____________________________ Date: ________  Time: _____

## 2025-01-16 NOTE — PROGRESS NOTES
"[address]     Case Management Encounter: Met with team, discussed patient progress, discharge plan and any impediments to discharge.    Writer met with pt to discuss discharge and aftercare planning. Pt shared that he was sober for 13 months and then \"everything went to shit and I gave up.\" Pt reported he is not interested in treatment or therapy. He reported he is facing eviction and appears very agitated. Pt denied needing resources for emergency assistance programs in his county. At this time pt is declining all case management services and resources.     Insurance: MA     Legal Status:  Voluntary   County: NA  File Number: NA  Start and expiration date of commitment: NA    SUDs Assessment Status: Not needed     ROIs on file: None at this time.     Living Situation: Lives alone in his own apartment     Current Providers, Supports & Collateral:  None    Current Plan/Referral Status: Detox only       PIERRE Perkins  [address] - Adult Inpatient Addiction Psychiatry Unit           "

## 2025-01-16 NOTE — CONSULTS
[facility]  Internal Medicine Consult     John Paul Velazquez MRN# [id number]   Age: 54 year old YOB: 1970     Date of Admission: 1/15/2025  Date of Consult: 1/16/2025    Primary Care Provider: No Ref-Primary, Physician    Requesting Service: Behavioral Health - Angela Nix MD  Reason for Consult: General Medical Evaluation      SUBJECTIVE   CC:   Alcohol Withdrawal   Assessment and Plan/Recommendations:     John Paul Velazquez is a 54 year old male with PMHx significant for HTN, HFrEF, asthma, alcohol use disorder, tobacco use disorder, depression, and anxiety.  Patient was admitted to station 3A for detox. Internal medicine consulted for medical co-management.    # Alcohol withdrawal, hx of alcohol use disorder   MSSA 9 this shift. Negative hx of withdrawal seizures. Pt reports drinking up to 1L alcohol daily. Blood EtOH on arrival was 0.23. Tox urine screen was also positive for cannabinoids. Currently endorses some diaphoresis, tremors, dry heaving, and myalgias.  - Continue MSSA   - Folvite, multi-vites, thiamine supplementation   - Further management per Psychiatry   - Seizures precautions not recommended at this time    # Transaminitis 2/2 EtOH use disorder   AST 88, ALT 79, Alk Phos 65. Bilirubin 0.4. No significant abdominal pain on exam.  - No need to trend unless pt becomes symptomatic with severe abdominal pain, nausea/vomiting, and/or fevers   - Ok to give Tylenol up to 2-3 grams daily for supportive cares   - Repeat CMP in the next few weeks at PCP follow up once pt is outside of acute EtOH withdrawal    # Asthma with exacerbation  Patient has a history of asthma which he utilizes daily Symbicort and prn albuterol for. He could not recall taking his inhalers yesterday and on arrival to the ED he was wheezing and coughing on exam. He was given albuterol and prednisone for a suspected exacerbation. Upper respiratory panel was negative for COVID-19, influenza A+B, and  RSV. He is still wheezing and coughing on exam today but has no other concerning infectious signs or symptoms. Prednisone burst was started 1/15, will continue x 5 days.  - Continue prednisone 40 mg daily x 5 days  - Continue fluticasone-vilanterol 2 puffs BID  - Recommend scheduled albuterol q4 hours today   - Can resume albuterol prn tomorrow    # Hypokalemia  Patient was found to be hypokalemic to 2.9 yesterday in the ED, likely 2/2 chronic alcohol use and poor PO intake. He was given potassium 60 mEq and repeat BMP this morning showed resolution of potassium to 4.0.   - Repeat BMP tomorrow AM    # Chronic systolic CHF  # HTN  Patient has a longstanding history of CHF and HTN and has been followed in the past [place] [facility] [place] for his HF. He was previously on lasix 40 mg and lisinopril 10 mg but these were stopped on 7/10/2024. Per the chart review, his hypertension has been well controlled with metoprolol alone. Unfortunately patient endorses he has not been taking the metoprolol lately during this binge episode. Acute hypertension today, 163/134, likely secondary to acute alcohol withdrawal. His most recent TTE was 7/10/2024 which demonstrated EF of 44%. There were no signs of fluid overload on examination today. No need to change his pta HTN/HF medications at this time.  - Continue pta metoprolol 12.5 mg daily  - Monitor BP closely during inpatient stay    # Tobacco use  Reports longstanding history of cigarette use, anywhere from 1/4 - 1/2 pack daily.  - Nicotine patch 14 mg daily    # Depression  # Anxiety  Concomitant with substance use disorders. He was previously on anti-depressants but has not been on them for awhile. Management per psychiatry team, please see their notes for further details.      Currently, medically stable and internal medicine will sign off. Please contact if future questions or concerns arise. Thank you for the opportunity to be a part of this patient's care.     Shilpa  WilfredMercy Health St. Elizabeth Boardman Hospitaljesse  Internal Medicine [facility] Hospitalist     HPI:   John Paul Velazquez is a 54 year old male who presented to the ED on 1/15 seeking detox from alcohol. Today he reports having drank approximately 1 L alcohol daily for the past 4-5 months. Additionally, he smokes marijuana and cigarettes on a daily basis. Denies any other substance use aside from cocaine approximately 2 years ago. John Paul cannot recall having any withdrawal seizures in the past.     Today, John Paul is not feeling well. He endorses headache, nausea, dry heaving, tremors, diaphoreses, anxiousness, hunger, myalgias, and loose stools. Additionally, he still feels slightly short of breath with some intermittent dry coughing. Endorses feeling some respiratory relief after administration of albuterol and prednisone last night in the ED. Otherwise denies any chest pain, lower extremity swelling, palpitations, tachycardia, abdominal pain, vomiting or pruritus. John Paul reports he has not ate a substantial meal in awhile secondary to his binge drinking episode. He denies any other questions or concerns for us today.       Past Medical History:     Past Medical History:   Diagnosis Date    Alcohol use disorder     Asthma     Chronic systolic congestive heart failure (H)     Depression     KRISTINE (generalized anxiety disorder)     HTN (hypertension)     Tobacco use disorder       Reviewed and updated in [employer].     Past Surgical History:    History reviewed. No pertinent surgical history.      Social History:     Social History     Tobacco Use    Smoking status: Every Day     Current packs/day: 0.50     Types: Cigarettes    Smokeless tobacco: Never   Substance Use Topics    Alcohol use: Yes     Comment: 1 L/day    Drug use: Yes     Types: Cocaine, Marijuana     Comment: Hx of cocaine use, last used in 2023, currently smokes marijuana daily      Family History:   History reviewed. No pertinent family history.      Allergies:   No Known Allergies      Medications:  "  Reviewed. Please see MAR     Review of Systems:   10 point ROS of systems including Constitutional, Eyes, Respiratory, Cardiovascular, Gastroenterology, Genitourinary, Integumentary, Muscularskeletal, Psychiatric were all negative except for pertinent positives noted in my HPI.    OBJECTIVE   Physical Exam:   Vitals were reviewed  Blood pressure (!) 151/106, pulse 86, temperature 98.5  F (36.9  C), resp. rate 16, height 1.651 m (5' 5\"), weight 59 kg (130 lb), SpO2 97%.  General: Alert and oriented x3. No gross tremors or diaphoresis noted. Tired appearing.  EYES: PERRLA, EOM. Anicteric.  HENT: Atraumatic.   Lungs: No increased work of breathing. Patient has multiple dry coughs during the exam. Positive wheezing with expiration diffusely throughout bilateral lungs. No obvious consolidation noted.  Cardiac: RRR, no murmurs, rubs or gallops noted.  GI: Abdomen soft, non-tender without rebound or guarding. + Bowel sounds.  MSK: Bilateral lower extremities without any edema.  Skin: No jaundice, rashes, or lesions.  Neurologic: No focal deficits, CN II-XII grossly intact.     Data:      Latest Reference Range & Units 01/15/25 19:20   Sodium 135 - 145 mmol/L 139   Potassium 3.4 - 5.3 mmol/L 2.9 (L)   Chloride 98 - 107 mmol/L 97 (L)   Carbon Dioxide (CO2) 22 - 29 mmol/L 28   Urea Nitrogen 6.0 - 20.0 mg/dL 12.4   Creatinine 0.67 - 1.17 mg/dL 0.99   GFR Estimate >60 mL/min/1.73m2 >90   Calcium 8.8 - 10.4 mg/dL 8.0 (L)   Anion Gap 7 - 15 mmol/L 14   Magnesium 1.7 - 2.3 mg/dL 2.1   Albumin 3.5 - 5.2 g/dL 4.5   Protein Total 6.4 - 8.3 g/dL 6.7   Alkaline Phosphatase 40 - 150 U/L 65   ALT 0 - 70 U/L 79 (H)   AST 0 - 45 U/L 88 (H)   Bilirubin Total <=1.2 mg/dL 0.4   GGT 8 - 61 U/L 249 (H)   Glucose 70 - 99 mg/dL 102 (H)   Estimated Average Glucose <117 mg/dL 137 (H)   Hemoglobin A1C <5.7 % 6.4 (H)   Lipase 13 - 60 U/L 80 (H)   TSH 0.30 - 4.20 uIU/mL 1.29      [facility] Reference Range & Units 01/15/25 19:20   WBC 4.0 - 11.0 10e3/uL " 8.0   Hemoglobin 13.3 - 17.7 g/dL 15.1   Hematocrit 40.0 - 53.0 % 42.8   Platelet Count 150 - 450 10e3/uL 336      Latest Reference Range & Units 01/15/25 19:39   SARS CoV2 PCR Negative  Negative   Influenza A Negative  Negative   Influenza B Negative  Negative   Resp Syncytial Virus Negative  Negative       Shilpa Alvarenga, PA-S2

## 2025-01-17 VITALS
WEIGHT: 130 LBS | DIASTOLIC BLOOD PRESSURE: 94 MMHG | RESPIRATION RATE: 16 BRPM | HEIGHT: 65 IN | OXYGEN SATURATION: 97 % | BODY MASS INDEX: 21.66 KG/M2 | HEART RATE: 94 BPM | TEMPERATURE: 98.4 F | SYSTOLIC BLOOD PRESSURE: 139 MMHG

## 2025-01-17 PROCEDURE — 250N000013 HC RX MED GY IP 250 OP 250 PS 637: Performed by: PSYCHIATRY & NEUROLOGY

## 2025-01-17 PROCEDURE — 250N000013 HC RX MED GY IP 250 OP 250 PS 637: Performed by: STUDENT IN AN ORGANIZED HEALTH CARE EDUCATION/TRAINING PROGRAM

## 2025-01-17 PROCEDURE — 99239 HOSP IP/OBS DSCHRG MGMT >30: CPT | Performed by: PSYCHIATRY & NEUROLOGY

## 2025-01-17 PROCEDURE — 250N000012 HC RX MED GY IP 250 OP 636 PS 637: Performed by: PSYCHIATRY & NEUROLOGY

## 2025-01-17 RX ORDER — FOLIC ACID 1 MG/1
1 TABLET ORAL DAILY
Qty: 30 TABLET | Refills: 0 | Status: SHIPPED | OUTPATIENT
Start: 2025-01-17

## 2025-01-17 RX ORDER — MULTIPLE VITAMINS W/ MINERALS TAB 9MG-400MCG
1 TAB ORAL DAILY
Qty: 30 TABLET | Refills: 0 | Status: SHIPPED | OUTPATIENT
Start: 2025-01-17

## 2025-01-17 RX ORDER — BUDESONIDE AND FORMOTEROL FUMARATE DIHYDRATE 160; 4.5 UG/1; UG/1
2 AEROSOL RESPIRATORY (INHALATION)
Qty: 6 G | Refills: 0 | Status: SHIPPED | OUTPATIENT
Start: 2025-01-17

## 2025-01-17 RX ORDER — ALBUTEROL SULFATE 90 UG/1
2 INHALANT RESPIRATORY (INHALATION) EVERY 6 HOURS PRN
Qty: 18 G | Refills: 0 | Status: SHIPPED | OUTPATIENT
Start: 2025-01-17

## 2025-01-17 RX ORDER — METOPROLOL SUCCINATE 25 MG/1
12.5 TABLET, EXTENDED RELEASE ORAL DAILY
Qty: 15 TABLET | Refills: 0 | Status: SHIPPED | OUTPATIENT
Start: 2025-01-17

## 2025-01-17 RX ORDER — LANOLIN ALCOHOL/MO/W.PET/CERES
100 CREAM (GRAM) TOPICAL DAILY
Qty: 30 TABLET | Refills: 0 | Status: SHIPPED | OUTPATIENT
Start: 2025-01-17

## 2025-01-17 RX ORDER — MIRTAZAPINE 15 MG/1
15 TABLET, FILM COATED ORAL AT BEDTIME
Qty: 30 TABLET | Refills: 0 | Status: SHIPPED | OUTPATIENT
Start: 2025-01-17

## 2025-01-17 RX ORDER — PREDNISONE 20 MG/1
40 TABLET ORAL DAILY
Qty: 4 TABLET | Refills: 0 | Status: SHIPPED | OUTPATIENT
Start: 2025-01-18 | End: 2025-01-20

## 2025-01-17 RX ADMIN — ALBUTEROL SULFATE 2 PUFF: 90 AEROSOL, METERED RESPIRATORY (INHALATION) at 19:58

## 2025-01-17 RX ADMIN — Medication 1 TABLET: at 09:01

## 2025-01-17 RX ADMIN — ALBUTEROL SULFATE 2 PUFF: 90 AEROSOL, METERED RESPIRATORY (INHALATION) at 12:47

## 2025-01-17 RX ADMIN — NICOTINE 1 PATCH: 14 PATCH, EXTENDED RELEASE TRANSDERMAL at 09:01

## 2025-01-17 RX ADMIN — NICOTINE POLACRILEX 2 MG: 2 GUM, CHEWING BUCCAL at 14:56

## 2025-01-17 RX ADMIN — Medication 12.5 MG: at 09:00

## 2025-01-17 RX ADMIN — FLUTICASONE FUROATE AND VILANTEROL TRIFENATATE 1 PUFF: 200; 25 POWDER RESPIRATORY (INHALATION) at 09:07

## 2025-01-17 RX ADMIN — THIAMINE HCL TAB 100 MG 100 MG: 100 TAB at 09:01

## 2025-01-17 RX ADMIN — ALBUTEROL SULFATE 2 PUFF: 90 AEROSOL, METERED RESPIRATORY (INHALATION) at 09:07

## 2025-01-17 RX ADMIN — ALBUTEROL SULFATE 2 PUFF: 90 AEROSOL, METERED RESPIRATORY (INHALATION) at 16:06

## 2025-01-17 RX ADMIN — PREDNISONE 40 MG: 20 TABLET ORAL at 09:01

## 2025-01-17 RX ADMIN — FOLIC ACID 1 MG: 1 TABLET ORAL at 09:01

## 2025-01-17 ASSESSMENT — ACTIVITIES OF DAILY LIVING (ADL)
ADLS_ACUITY_SCORE: 20
ADLS_ACUITY_SCORE: 15
ADLS_ACUITY_SCORE: 15
ADLS_ACUITY_SCORE: 20
ADLS_ACUITY_SCORE: 15
ADLS_ACUITY_SCORE: 20
ORAL_HYGIENE: INDEPENDENT
ADLS_ACUITY_SCORE: 15
ADLS_ACUITY_SCORE: 20
LAUNDRY: WITH SUPERVISION
ADLS_ACUITY_SCORE: 15
ADLS_ACUITY_SCORE: 20
ADLS_ACUITY_SCORE: 20
DRESS: INDEPENDENT
ADLS_ACUITY_SCORE: 20
ORAL_HYGIENE: INDEPENDENT
ADLS_ACUITY_SCORE: 15
ADLS_ACUITY_SCORE: 20
DRESS: INDEPENDENT
ADLS_ACUITY_SCORE: 15
ADLS_ACUITY_SCORE: 20
ADLS_ACUITY_SCORE: 15
ADLS_ACUITY_SCORE: 15
HYGIENE/GROOMING: INDEPENDENT
HYGIENE/GROOMING: INDEPENDENT
ADLS_ACUITY_SCORE: 15
ADLS_ACUITY_SCORE: 20
LAUNDRY: WITH SUPERVISION

## 2025-01-17 NOTE — PLAN OF CARE
Patient discharged to home. Reports being picked up by Jacek. Patient escorted off the unit by JOSSY Ambriz       All patient belongings from room, locked bin and security were sent with patient.  This RN reviewed discharge instructions, teachings, patient's labs, and discharge recommendations with patient.  This RN reviewed patient's prescribed medications but was unable to send him home with meds because Pharmacy did not send up his meds. Writer tried calling but pharmacy had closed for the day. He was able to go home with both of his inhalers promising to come and pick the rest of his meds in the morning. He verbalizes and demonstrates understanding of all teachings.He denied thoughts of harm towards self or others and  any current medical issues at this time.  Patient denies any further questions and is now discharged at this time.

## 2025-01-17 NOTE — PROGRESS NOTES
"[address]     Case Management Encounter: Met with team, discussed patient progress, discharge plan and any impediments to discharge.    Writer met with pt to discuss discharge and aftercare planning. Pt shared that he was sober for 13 months and then \"everything went to shit and I gave up.\" Pt reported he is not interested in treatment or therapy. He reported he is facing eviction and appears very agitated. Pt denied needing resources for emergency assistance programs in his county. At this time pt is declining all case management services and resources.     Insurance: MA     Legal Status:  Voluntary   County: NA  File Number: NA  Start and expiration date of commitment: NA    SUDs Assessment Status: Not needed     ROIs on file: None at this time.     Living Situation: Lives alone in his own apartment     Current Providers, Supports & Collateral:  None    Current Plan/Referral Status: Detox only       PIERRE Perkins  [address] - Adult Inpatient Addiction Psychiatry Unit           "

## 2025-01-17 NOTE — PLAN OF CARE
Problem: Alcohol Withdrawal  Goal: Alcohol Withdrawal Symptom Control  Outcome: Progressing   Goal Outcome Evaluation:    MSSA scores of 5/6,patient did not require medication for alcohol withdrawal and is observed to sleep throughout the noc.

## 2025-01-17 NOTE — PLAN OF CARE
"Goal Outcome Evaluation:    Plan of Care Reviewed With: patient      Patient is isolative, withdrawn to room this am. Patient is ambulating independently, balanced and steady. Patient is alert and oriented x 4. Patient is minimally attending/participating in unit programming. Pt is minimally social with peers. Affect is blunted/flat, mood is anxious/depressed. Patient rates anxiety a 5/10 and depression a 3/10 on the 1-10 severity scale. Patient denies SI/SIB/HI. Pt denies auditory/visual hallucinations. Patient verbally contracts for safety on the unit. Patient is tolerating medications well, denies any current side effects.     Pt's MSSA = 5 upon first morning withdrawal assessment. Patient states, \"I feel much better.\" Patient reports a good appetite, and did not identify any issues with sleep. Rest, fluids, and food encouraged. Status 15 checks remain. Patient is able to make needs known appropriately. Patient denies any unmet needs at this time. Patient denies skin issues. Patient denies any toileting issues.     Patient plans to discharge if out of detox at 2026 this evening. Patient states he will need to take a cab. See discharge order.    Blood pressure (!) 141/89, pulse 96, temperature 98.3  F (36.8  C), temperature source Oral, resp. rate 16, height 1.651 m (5' 5\"), weight 59 kg (130 lb), SpO2 95%.     Update:   Patient MSSA = 6 at lunchtime, patient stated he is anxious to discharge this evening. Patient ate 100% of lunch. Patient was encouraged to make a follow-up appointment with Primary Care Provider within one week of discharge.     Blood pressure 132/86, pulse 105, temperature 99.1  F (37.3  C), temperature source Oral, resp. rate 16, height 1.651 m (5' 5\"), weight 59 kg (130 lb), SpO2 95%.      "
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
## 2025-01-17 NOTE — PLAN OF CARE
"Goal Outcome Evaluation:    Plan of Care Reviewed With: patient        Pt visible in the milieu and mostly kept to self, watched TV and made and received phone calls. Pt endorsed moderate anxiety and received prn hydroxyzine with effectiveness and pt denied all other psych mental health symptoms and committed for safety. Pt scored 9 and 11 for MSSA and received valium 10 mg x2. Pt educated on MSSA protocol and pt stated \"I didn't know that and now I know\". Pt now hoping to discharge after 2030 tomorrow.   Adequate fluids and food intake, voiding freely and no BM concern. Pt denied shortness of breath and pt continued taking his albuterol per ordered. Pt denied pain and vital sign stable. /87 (BP Location: Left arm)   Pulse 93   Temp 97.1  F (36.2  C) (Temporal)   Resp 16   Ht 1.651 m (5' 5\")   Wt 59 kg (130 lb)   SpO2 97%   BMI 21.63 kg/m                "